# Patient Record
Sex: MALE | Race: WHITE | NOT HISPANIC OR LATINO | Employment: OTHER | URBAN - METROPOLITAN AREA
[De-identification: names, ages, dates, MRNs, and addresses within clinical notes are randomized per-mention and may not be internally consistent; named-entity substitution may affect disease eponyms.]

---

## 2022-11-04 ENCOUNTER — DOCUMENTATION ONLY (OUTPATIENT)
Dept: SURGERY | Facility: CLINIC | Age: 57
End: 2022-11-04
Payer: OTHER GOVERNMENT

## 2022-11-04 ENCOUNTER — OFFICE VISIT (OUTPATIENT)
Dept: PRIMARY CARE CLINIC | Facility: CLINIC | Age: 57
End: 2022-11-04
Payer: OTHER GOVERNMENT

## 2022-11-04 VITALS
HEIGHT: 69 IN | RESPIRATION RATE: 18 BRPM | BODY MASS INDEX: 28.09 KG/M2 | SYSTOLIC BLOOD PRESSURE: 138 MMHG | TEMPERATURE: 98 F | WEIGHT: 189.69 LBS | HEART RATE: 86 BPM | DIASTOLIC BLOOD PRESSURE: 60 MMHG | OXYGEN SATURATION: 100 %

## 2022-11-04 DIAGNOSIS — I10 ESSENTIAL (PRIMARY) HYPERTENSION: ICD-10-CM

## 2022-11-04 DIAGNOSIS — Z12.5 PROSTATE CANCER SCREENING: ICD-10-CM

## 2022-11-04 DIAGNOSIS — Z11.4 SCREENING FOR HIV (HUMAN IMMUNODEFICIENCY VIRUS): ICD-10-CM

## 2022-11-04 DIAGNOSIS — Z12.11 COLON CANCER SCREENING: ICD-10-CM

## 2022-11-04 DIAGNOSIS — Z13.1 SCREENING FOR DIABETES MELLITUS: ICD-10-CM

## 2022-11-04 DIAGNOSIS — G47.33 OSA ON CPAP: ICD-10-CM

## 2022-11-04 DIAGNOSIS — Z13.6 ENCOUNTER FOR SCREENING FOR CARDIOVASCULAR DISORDERS: ICD-10-CM

## 2022-11-04 DIAGNOSIS — F41.0 GENERALIZED ANXIETY DISORDER WITH PANIC ATTACKS: Primary | ICD-10-CM

## 2022-11-04 DIAGNOSIS — Z23 NEED FOR VACCINATION: ICD-10-CM

## 2022-11-04 DIAGNOSIS — Z11.59 NEED FOR HEPATITIS C SCREENING TEST: ICD-10-CM

## 2022-11-04 DIAGNOSIS — F41.1 GENERALIZED ANXIETY DISORDER WITH PANIC ATTACKS: Primary | ICD-10-CM

## 2022-11-04 PROCEDURE — 99204 PR OFFICE/OUTPT VISIT, NEW, LEVL IV, 45-59 MIN: ICD-10-PCS | Mod: S$PBB,,, | Performed by: FAMILY MEDICINE

## 2022-11-04 PROCEDURE — 99204 OFFICE O/P NEW MOD 45 MIN: CPT | Mod: S$PBB,,, | Performed by: FAMILY MEDICINE

## 2022-11-04 PROCEDURE — 99999 PR PBB SHADOW E&M-NEW PATIENT-LVL IV: CPT | Mod: PBBFAC,,, | Performed by: FAMILY MEDICINE

## 2022-11-04 PROCEDURE — 90471 IMMUNIZATION ADMIN: CPT | Mod: PBBFAC,PN

## 2022-11-04 PROCEDURE — 99999 PR PBB SHADOW E&M-NEW PATIENT-LVL IV: ICD-10-PCS | Mod: PBBFAC,,, | Performed by: FAMILY MEDICINE

## 2022-11-04 PROCEDURE — 99204 OFFICE O/P NEW MOD 45 MIN: CPT | Mod: PBBFAC,25,PN | Performed by: FAMILY MEDICINE

## 2022-11-04 RX ORDER — CHLORTHALIDONE 25 MG/1
25 TABLET ORAL DAILY
COMMUNITY
End: 2022-11-04 | Stop reason: SDUPTHER

## 2022-11-04 RX ORDER — AMLODIPINE BESYLATE 10 MG/1
10 TABLET ORAL DAILY
Qty: 90 TABLET | Refills: 3 | Status: SHIPPED | OUTPATIENT
Start: 2022-11-04 | End: 2024-02-01

## 2022-11-04 RX ORDER — ZOSTER VACCINE RECOMBINANT, ADJUVANTED 50 MCG/0.5
0.5 KIT INTRAMUSCULAR ONCE
Qty: 1 EACH | Refills: 1 | Status: SHIPPED | OUTPATIENT
Start: 2022-11-04 | End: 2022-11-04

## 2022-11-04 RX ORDER — CHLORTHALIDONE 25 MG/1
25 TABLET ORAL DAILY
Qty: 90 TABLET | Refills: 3 | Status: SHIPPED | OUTPATIENT
Start: 2022-11-04 | End: 2023-04-11

## 2022-11-04 RX ORDER — LISINOPRIL 40 MG/1
40 TABLET ORAL DAILY
COMMUNITY
End: 2022-11-04 | Stop reason: SDUPTHER

## 2022-11-04 RX ORDER — CETIRIZINE HYDROCHLORIDE 10 MG/1
10 TABLET ORAL DAILY
Qty: 90 TABLET | Refills: 3 | Status: SHIPPED | OUTPATIENT
Start: 2022-11-04 | End: 2023-11-28

## 2022-11-04 RX ORDER — SERTRALINE HYDROCHLORIDE 50 MG/1
TABLET, FILM COATED ORAL
Qty: 83 TABLET | Refills: 0 | Status: SHIPPED | OUTPATIENT
Start: 2022-11-04 | End: 2022-12-21

## 2022-11-04 RX ORDER — LISINOPRIL 40 MG/1
40 TABLET ORAL DAILY
Qty: 90 TABLET | Refills: 3 | Status: SHIPPED | OUTPATIENT
Start: 2022-11-04 | End: 2023-10-30

## 2022-11-04 RX ORDER — ATORVASTATIN CALCIUM 10 MG/1
10 TABLET, FILM COATED ORAL DAILY
COMMUNITY
End: 2022-11-04

## 2022-11-04 RX ORDER — AMLODIPINE BESYLATE 10 MG/1
10 TABLET ORAL DAILY
COMMUNITY
End: 2022-11-04 | Stop reason: SDUPTHER

## 2022-11-04 NOTE — PROGRESS NOTES
Verified pt by name and . NKDA. Per physician orders pt was administered Influenza Vaccine IM to left deltoid using aseptic technique. Pt tolerated well. No adverse effects or pain reported. MD notified.

## 2022-11-04 NOTE — PROGRESS NOTES
"Subjective:       Patient ID: Jose Carlos Worthington is a 57 y.o. male.    Chief Complaint: Establish Care    Recently retired from the , seen today to establish care.  [facility]. says he was started on atorvastatin "because my blood pressure was so high. " has never had high cholesterol.  No history of stroke or MI.    Anxiety  Presents for initial visit. Onset was 6 to 12 months ago. The problem has been gradually worsening. Symptoms include excessive worry, nervous/anxious behavior, panic and restlessness. Patient reports no chest pain, compulsions, confusion, depressed mood, dizziness, feeling of choking, irritability, malaise, nausea, palpitations, shortness of breath or suicidal ideas. The severity of symptoms is severe. The quality of sleep is fair. Nighttime awakenings: occasional.     Risk factors include a major life event and prior traumatic experience. There is no history of anxiety/panic attacks, bipolar disorder, chronic lung disease, depression, fibromyalgia or hyperthyroidism. Past treatments include nothing.   Review of Systems   Constitutional:  Negative for chills, fatigue, fever and irritability.   HENT:  Negative for congestion.    Eyes:  Negative for visual disturbance.   Respiratory:  Negative for cough and shortness of breath.    Cardiovascular:  Negative for chest pain and palpitations.   Gastrointestinal:  Negative for abdominal pain, nausea and vomiting.   Genitourinary:  Negative for difficulty urinating.   Musculoskeletal:  Negative for arthralgias.   Skin:  Negative for rash.   Neurological:  Negative for dizziness.   Psychiatric/Behavioral:  Negative for confusion, sleep disturbance and suicidal ideas. The patient is nervous/anxious.      Objective:      Vitals:    11/04/22 1216   BP: 138/60   BP Location: Right arm   Patient Position: Sitting   BP Method: Medium (Manual)   Pulse: 86   Resp: 18   Temp: 98 °F (36.7 °C)   TempSrc: Tympanic   SpO2: " "100%   Weight: 86 kg (189 lb 11.3 oz)   Height: 5' 9" (1.753 m)       Physical Exam  Vitals and nursing note reviewed.   Constitutional:       General: He is not in acute distress.     Appearance: Normal appearance. He is well-developed.   HENT:      Head: Normocephalic and atraumatic.   Neck:      Vascular: No carotid bruit.   Cardiovascular:      Rate and Rhythm: Normal rate and regular rhythm.      Heart sounds: Normal heart sounds.   Pulmonary:      Effort: Pulmonary effort is normal.      Breath sounds: Normal breath sounds.   Abdominal:      General: Bowel sounds are normal. There is no distension.      Tenderness: There is no abdominal tenderness.   Musculoskeletal:      Right lower leg: No edema.      Left lower leg: No edema.   Skin:     General: Skin is warm and dry.   Neurological:      Mental Status: He is alert and oriented to person, place, and time.   Psychiatric:         Mood and Affect: Mood is anxious.         Behavior: Behavior normal.         Thought Content: Thought content normal.         Cognition and Memory: Cognition and memory normal.       No results found for: WBC, HGB, HCT, PLT, CHOL, TRIG, HDL, LDLDIRECT, ALT, AST, NA, K, CL, CREATININE, BUN, CO2, TSH, PSA, INR, GLUF, HGBA1C, MICROALBUR   Assessment:       1. Generalized anxiety disorder with panic attacks    2. Need for vaccination    3. Essential (primary) hypertension    4. Prostate cancer screening    5. Colon cancer screening    6. Encounter for screening for cardiovascular disorders    7. ZENOBIA on CPAP    8. Need for hepatitis C screening test    9. Screening for HIV (human immunodeficiency virus)    10. Screening for diabetes mellitus          Plan:       Generalized anxiety disorder with panic attacks  -     sertraline (ZOLOFT) 50 MG tablet; Take 0.5 tablets (25 mg total) by mouth once daily for 14 days, THEN 1 tablet (50 mg total) once daily.  Dispense: 83 tablet; Refill: 0  -     Ambulatory referral/consult to Primary Care " Behavioral Health (Non-Opioids); Future; Expected date: 11/11/2022  Scored 14 on GAD7. Would benefit from combination of medication and CBT  Need for vaccination  -     Influenza - Quadrivalent *Preferred* (6 months+) (PF)    Essential (primary) hypertension  -     CBC Auto Differential; Future; Expected date: 11/04/2022  -     Comprehensive Metabolic Panel; Future; Expected date: 11/04/2022  -     chlorthalidone (HYGROTEN) 25 MG Tab; Take 1 tablet (25 mg total) by mouth once daily.  Dispense: 90 tablet; Refill: 3  -     amLODIPine (NORVASC) 10 MG tablet; Take 1 tablet (10 mg total) by mouth once daily.  Dispense: 90 tablet; Refill: 3  -     lisinopriL (PRINIVIL,ZESTRIL) 40 MG tablet; Take 1 tablet (40 mg total) by mouth once daily.  Dispense: 90 tablet; Refill: 3    Prostate cancer screening  -     PSA, Screening; Future; Expected date: 11/04/2022    Colon cancer screening  -     Case Request Endoscopy: COLONOSCOPY    Encounter for screening for cardiovascular disorders  -     Lipid Panel; Future; Expected date: 11/04/2022    ZENOBIA on CPAP  -     TSH; Future; Expected date: 11/04/2022    Need for hepatitis C screening test  -     Hepatitis C Antibody; Future; Expected date: 11/04/2022    Screening for HIV (human immunodeficiency virus)  -     HIV 1/2 Ag/Ab (4th Gen); Future; Expected date: 11/04/2022    Screening for diabetes mellitus  -     Hemoglobin A1C; Future; Expected date: 11/04/2022    Other orders  -     cetirizine (ZYRTEC) 10 MG tablet; Take 1 tablet (10 mg total) by mouth once daily.  Dispense: 90 tablet; Refill: 3    Medication List with Changes/Refills   New Medications    CETIRIZINE (ZYRTEC) 10 MG TABLET    Take 1 tablet (10 mg total) by mouth once daily.    SERTRALINE (ZOLOFT) 50 MG TABLET    Take 0.5 tablets (25 mg total) by mouth once daily for 14 days, THEN 1 tablet (50 mg total) once daily.   Changed and/or Refilled Medications    Modified Medication Previous Medication    AMLODIPINE (NORVASC) 10  MG TABLET amLODIPine (NORVASC) 10 MG tablet       Take 1 tablet (10 mg total) by mouth once daily.    Take 10 mg by mouth once daily.    CHLORTHALIDONE (HYGROTEN) 25 MG TAB chlorthalidone (HYGROTEN) 25 MG Tab       Take 1 tablet (25 mg total) by mouth once daily.    Take 25 mg by mouth once daily.    LISINOPRIL (PRINIVIL,ZESTRIL) 40 MG TABLET lisinopriL (PRINIVIL,ZESTRIL) 40 MG tablet       Take 1 tablet (40 mg total) by mouth once daily.    Take 40 mg by mouth once daily.   Discontinued Medications    ATORVASTATIN (LIPITOR) 10 MG TABLET    Take 10 mg by mouth once daily.

## 2022-11-04 NOTE — PROGRESS NOTES
This patient arrived at the clinic as a walk in patient, post visit with the physician,  requesting to be scheduled to have a Colonoscopy. A review of the patient's chart,denotes a referral to schedule the patient to have a Colonoscopy for colon cancer screening. The patient verbally consented to be scheduled to have the Colonoscopy, and was scheduled for the Colonoscopy on the date of the patient's request 01/12/2022. The patient was given a detailed explanation of the Colonoscopy prep instructions, along with a copy of the associated bowel prep instructions. The patient acknowledged understanding of the prep instructions, and was give an opportunity to ask any questions about the Colonoscopy procedure, and the associated prep instructions.     Bowel Prep/SUPREP instructions                                               [place]    [address] [place] [address]      You are scheduled for a Colonoscopy with _______________________ on ____________________   At [place] in [place].    Check in at the hospital on 1st floor Registration area next to Emergency room.    Please call [phone] to reschedule or if you have any questions.    An adult friend/family member must come with you to drive you home.  You cannot drive, take a taxi, Uber/Lyft or bus to leave the Hospital alone. If you do not have someone with you to drive you home, your test will be cancelled.       Please follow the directions of your doctor if you take any pills that thin your blood.  If you take these meds: Aggrenox, Brilinta, Effient, Eliquis, Lovenox, Plavix, Pletal Pradaxa ticilid, Xarelto, or Coumadin, let the doctor's office know.    Don't: On the morning of the test do not take insulin or pills for diabetes.   Do: On the morning of the test, do take any pills for blood pressure, heart, anti-rejection and or seizures with a small sip of water. Bring any inhalers with you day of  procedure.    To have a good prep, you must follow these instructions- please do not use the directions from the pharmacy!      The doctor will send a prescription for the SUPREP   The day Before the test:   You can only drink CLEAR LIQUIDS the whole day before your test. You can't eat any food for the whole day.    You CAN have:   Water,Coffee or decaf coffee (no milk or cream)    Tea   Soft drinks- regular and sugar free   Jell-O (green or yellow)   Apple Juice, grape juice, white cranberry juice   Gatorade, Power Aid, Crystal Light, El Aid   Lemonade and Limeade   Bouillon, clear soup   Snowball, popsicles   YOU CAN'T DRINK ANYTHING RED   YOU CAN'T DRINK ALCOHOL   ONLY DRINK WHAT IS ON THIS List      At 5pm the night before your test:   Pour the 1st bottle of SUPREP into the cup provided in the box.  Add water to the line on the cup and mix well. Drink the whole cup and then drink 2 more full cups of water over the 1 hour.    This can be easier to drink if it is cold.  You can mix it 20 minutes ahead of time and place in the refrigerator before you drink it.  You must drink it within 30-45 minutes of mixing it. Do NOT pour the drink over ice. You can drink it with a straw.    The Day of the test- We will call you 1 day before your test to tell you what time to get there.    5 hours before you come to the hospital (this may be the middle of the night)   Pour the 2nd bottle of SUPREP into to the cup provided in the box. Add water to the line on the cup and mix well. Drink the whole cup and then drink 2 more full cups of water over 1 hour.    It might be easier to drink if it is cold. You can mix it 20 minutes ahead of time and place in the refrigerator before you drink it. You must drink it within 30-45 minutes of mixing it. Do NOT pour the drink over ice. You can drink it with a straw.   YOU CAN'T EAT OR DRINK ANYTHING ELSE ONCE YOU FINISH THE PREP.   Leave all valuables and jewelry at home. You will be at the  [facility] for 2-4 hours.    Call the Endoscopy Scheduling Department at [phone] with any questions about your procedure.    Please  your medication from your local pharmacy. If you are unable to  the SUPREP Kit please contact our office.   Thank you   Endo Scheduling Dept   [facility]

## 2022-11-07 ENCOUNTER — PATIENT MESSAGE (OUTPATIENT)
Dept: BEHAVIORAL HEALTH | Facility: CLINIC | Age: 57
End: 2022-11-07
Payer: OTHER GOVERNMENT

## 2022-11-07 ENCOUNTER — TELEPHONE (OUTPATIENT)
Dept: BEHAVIORAL HEALTH | Facility: CLINIC | Age: 57
End: 2022-11-07
Payer: OTHER GOVERNMENT

## 2022-11-07 RX ORDER — SODIUM, POTASSIUM,MAG SULFATES 17.5-3.13G
1 SOLUTION, RECONSTITUTED, ORAL ORAL DAILY
Qty: 1 KIT | Refills: 0 | Status: SHIPPED | OUTPATIENT
Start: 2022-11-07 | End: 2022-11-09

## 2022-11-07 NOTE — PROGRESS NOTES
CHW reached out to pt to complete intake. Pt will be out of town in November, 2022 but would like a December visit. Scheduled pt with Dr. Dominique Griffiths, PhD, Psychology on 12/08/22 at 9:45am.  Pt was sent the PHQ8 and Promis 10 Intake assessments via portal to complete within the week. Completed the GAD7 on 11/4/22 with YRIS Rodriguez.

## 2022-11-10 ENCOUNTER — TELEPHONE (OUTPATIENT)
Dept: BEHAVIORAL HEALTH | Facility: CLINIC | Age: 57
End: 2022-11-10
Payer: OTHER GOVERNMENT

## 2022-11-10 NOTE — PROGRESS NOTES
Behavioral Health Community Health Worker  Initial Assessment  Completed by:  Samra Ramírez     Date:  11/10/2022    Patient Enrollment in Behavioral Health Program:  Patient verbalized understanding of Behavioral Health Integration services to include:  Patient understands that CHW, LCSW, PharmD and consulting Psychiatrist are members of the care team working collaboratively with his/her primary care provider: Yes  Patient understands that activation of their [facility] patient portal account is required for accessing the full scope of team services: Yes  Patient understands that some counseling sessions may occur via video: Yes  Clinic visits with the psychiatrist may be subject to a co-pay based on your insurance: Yes  Patient consents to enroll in BHI program: Yes    Assessments     Single Item Health Literacy Scale:  How often do you need to have someone help you read instructions, pamphlets or other written material from your doctor or pharmacy?: Never    Promis 10:  Promis 10 Responses  In general, would you say your health is: Good  In general, would you say your quality of life is: Good  In general, how would you rate your physical health?: Good  In general, how would you rate your mental health, including your mood and your ability to think?: Fair  In general, how would you rate your satisfaction with your social activities and relationships?: Fair  In general, please rate how well you carry out your usual social activities and roles. (This includes activities at home, at work and in your community, and responsibilities as a parent, child, spouse, employee, friend, etc.): Fair  To what extent are you able to carry out your everyday physical activities such as walking, climbing stairs, carrying groceries, or moving a chair? : Moderately  How often have you been bothered by emotional problems such as feeling anxious, depressed or irritable?: Often  In the past 7 days, how would you rate your fatigue on  "average?: Mild  In the past 7 days, on a scale of 0 to 10 (where 0 is no pain and 10 is the worst pain imaginable) how would you rate your pain on average?: 3  Global Physical Health: 11  Global Mental health Score: 11    Depression PHQ8 on 11/09/2022 at 2:10pm = 5  No flowsheet data found.      Generalized Anxiety Disorder 7-Item Scale:  GAD7 11/9/2022   1. Feeling nervous, anxious, or on edge? 1   2. Not being able to stop or control worrying? 1   3. Worrying too much about different things? 1   4. Trouble relaxing? 1   5. Being so restless that it is hard to sit still? 1   6. Becoming easily annoyed or irritable? 1   7. Feeling afraid as if something awful might happen? 1   8. If you checked off any problems, how difficult have these problems made it for you to do your work, take care of things at home, or get along with other people? -   AURELIO-7 Score 7       History     Social History     Socioeconomic History    Marital status:    Tobacco Use    Smoking status: Former     Types: Cigarettes    Smokeless tobacco: Never   Substance and Sexual Activity    Alcohol use: Yes     Alcohol/week: 6.0 standard drinks     Types: 6 Cans of beer per week    Sexual activity: Yes     Partners: Female   Social History Narrative    ** Merged History Encounter **            Call Summary     Patient was referred to the BHI (Non-opioid) program by Primary Care Provider, YRIS Rodriguez. W contacted Jose Carlos Worthington who reports depression and anxiety. Patient scored "5" on the PHQ9 and "7" on the AURELIO 7. Based on these scores patient is eligible for the Behavioral Health Integration (Non-opioid) Program. CHW completed the intake and scheduled an office appointment for patient with Dr. Dominique Griffiths, PhD, Psychology on 12/08/2022 at 9:45am.          "

## 2022-11-21 ENCOUNTER — TELEPHONE (OUTPATIENT)
Dept: GASTROENTEROLOGY | Facility: CLINIC | Age: 57
End: 2022-11-21
Payer: OTHER GOVERNMENT

## 2022-12-07 ENCOUNTER — TELEPHONE (OUTPATIENT)
Dept: BEHAVIORAL HEALTH | Facility: CLINIC | Age: 57
End: 2022-12-07
Payer: OTHER GOVERNMENT

## 2022-12-07 NOTE — PROGRESS NOTES
CHW reached out to pt to remind him of office appointment with Dr. Dominique Griffiths, PhD, Psychology tomorrow at 9:45am. Pt confirmed  the appointment.

## 2022-12-08 ENCOUNTER — OFFICE VISIT (OUTPATIENT)
Dept: BEHAVIORAL HEALTH | Facility: CLINIC | Age: 57
End: 2022-12-08
Payer: OTHER GOVERNMENT

## 2022-12-08 DIAGNOSIS — F41.1 GENERALIZED ANXIETY DISORDER WITH PANIC ATTACKS: ICD-10-CM

## 2022-12-08 DIAGNOSIS — F43.10 PTSD (POST-TRAUMATIC STRESS DISORDER): Primary | ICD-10-CM

## 2022-12-08 DIAGNOSIS — F41.0 GENERALIZED ANXIETY DISORDER WITH PANIC ATTACKS: ICD-10-CM

## 2022-12-08 PROCEDURE — 99999 PR PBB SHADOW E&M-EST. PATIENT-LVL I: CPT | Mod: PBBFAC,,, | Performed by: PSYCHOLOGIST

## 2022-12-08 PROCEDURE — 99999 PR PBB SHADOW E&M-EST. PATIENT-LVL I: ICD-10-PCS | Mod: PBBFAC,,, | Performed by: PSYCHOLOGIST

## 2022-12-08 PROCEDURE — 90791 PSYCH DIAGNOSTIC EVALUATION: CPT | Mod: ,,, | Performed by: PSYCHOLOGIST

## 2022-12-08 PROCEDURE — 90791 PR PSYCHIATRIC DIAGNOSTIC EVALUATION: ICD-10-PCS | Mod: ,,, | Performed by: PSYCHOLOGIST

## 2022-12-08 PROCEDURE — 99211 OFF/OP EST MAY X REQ PHY/QHP: CPT | Mod: PBBFAC,PN | Performed by: PSYCHOLOGIST
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
## 2022-12-13 ENCOUNTER — PATIENT MESSAGE (OUTPATIENT)
Dept: BEHAVIORAL HEALTH | Facility: CLINIC | Age: 57
End: 2022-12-13
Payer: OTHER GOVERNMENT

## 2022-12-15 ENCOUNTER — TELEPHONE (OUTPATIENT)
Dept: PSYCHIATRY | Facility: CLINIC | Age: 57
End: 2022-12-15
Payer: OTHER GOVERNMENT

## 2022-12-21 ENCOUNTER — OFFICE VISIT (OUTPATIENT)
Dept: PRIMARY CARE CLINIC | Facility: CLINIC | Age: 57
End: 2022-12-21
Payer: OTHER GOVERNMENT

## 2022-12-21 DIAGNOSIS — F41.0 GENERALIZED ANXIETY DISORDER WITH PANIC ATTACKS: Primary | ICD-10-CM

## 2022-12-21 DIAGNOSIS — F43.10 PTSD (POST-TRAUMATIC STRESS DISORDER): ICD-10-CM

## 2022-12-21 DIAGNOSIS — F41.1 GENERALIZED ANXIETY DISORDER WITH PANIC ATTACKS: Primary | ICD-10-CM

## 2022-12-21 DIAGNOSIS — F51.04 PSYCHOPHYSIOLOGICAL INSOMNIA: ICD-10-CM

## 2022-12-21 PROCEDURE — 99214 PR OFFICE/OUTPT VISIT, EST, LEVL IV, 30-39 MIN: ICD-10-PCS | Mod: 95,,, | Performed by: FAMILY MEDICINE

## 2022-12-21 PROCEDURE — 99214 OFFICE O/P EST MOD 30 MIN: CPT | Mod: 95,,, | Performed by: FAMILY MEDICINE

## 2022-12-21 RX ORDER — DULOXETIN HYDROCHLORIDE 30 MG/1
30 CAPSULE, DELAYED RELEASE ORAL DAILY
Qty: 90 CAPSULE | Refills: 0 | Status: SHIPPED | OUTPATIENT
Start: 2022-12-21 | End: 2023-03-23

## 2022-12-21 RX ORDER — TRAZODONE HYDROCHLORIDE 50 MG/1
TABLET ORAL
Qty: 60 TABLET | Refills: 2 | Status: SHIPPED | OUTPATIENT
Start: 2022-12-21
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
## 2023-01-17 ENCOUNTER — TELEPHONE (OUTPATIENT)
Dept: SURGERY | Facility: CLINIC | Age: 58
End: 2023-01-17
Payer: OTHER GOVERNMENT

## 2023-01-17 NOTE — TELEPHONE ENCOUNTER
Called the patient in reference to rescheduling the Colonoscopy. Patient chose to reschedule the Colonoscopy from 01//23/2023 to 03/02/2023. Patient was informed the Colonoscopy Prep instructions received for the canceled Colonoscopy, remains applicable to the rescheduled Colonoscopy. Colonoscopy Prep instructions were reiterated,discussed and explained meticulously in minute,thorough detail. Patient acquiesced understanding of instructions. The patient was offered the opportunity ask any questions about the Colonoscopy procedure and the related Colonoscopy Prep instructions.  Nothing further was discussed.

## 2023-01-30 ENCOUNTER — TELEPHONE (OUTPATIENT)
Dept: PSYCHIATRY | Facility: CLINIC | Age: 58
End: 2023-01-30
Payer: OTHER GOVERNMENT

## 2023-01-30 NOTE — TELEPHONE ENCOUNTER
Called to get patient rescheduled for [facility] Intake. It appears patient may have cancelled appointment that was scheduled. Patient was not reached.

## 2023-02-10 ENCOUNTER — TELEPHONE (OUTPATIENT)
Dept: PRIMARY CARE CLINIC | Facility: CLINIC | Age: 58
End: 2023-02-10
Payer: OTHER GOVERNMENT

## 2023-02-10 NOTE — TELEPHONE ENCOUNTER
I called and spoke with pt who adv he has been having ear pain since 4 am today and now he's developing nasal congestion. Pt req if ear drops or any other rx can be sent in. Pt declines any fever.

## 2023-02-10 NOTE — TELEPHONE ENCOUNTER
----- Message from Venus Campbellnhadeola sent at 2/10/2023 12:02 PM CST -----  Contact: [phone] patient  1MEDICALADVICE     Patient is calling for Medical Advice regarding: earache, stuffy on right side     How long has patient had these symptoms: 1 day    Pharmacy name and phone#:   [id number] - [place] - [address] [place] [facility] [place] [address]  [address] [address]  Phone: [phone] Fax: [phone]        Would like response via [employer]:  Call Back. Pt would like to have something called in please today. Pt does not want to go the weekend feeling this way. Please call and advise.     Comments:

## 2023-02-14 ENCOUNTER — OFFICE VISIT (OUTPATIENT)
Dept: PSYCHIATRY | Facility: CLINIC | Age: 58
End: 2023-02-14
Payer: OTHER GOVERNMENT

## 2023-02-14 DIAGNOSIS — F43.10 PTSD (POST-TRAUMATIC STRESS DISORDER): Primary | ICD-10-CM

## 2023-02-14 PROCEDURE — 99499 NO LOS: ICD-10-PCS | Mod: S$PBB,,, | Performed by: PSYCHOLOGIST

## 2023-02-14 PROCEDURE — 99499 UNLISTED E&M SERVICE: CPT | Mod: S$PBB,,, | Performed by: PSYCHOLOGIST
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
## 2023-02-16 ENCOUNTER — PATIENT MESSAGE (OUTPATIENT)
Dept: PSYCHIATRY | Facility: CLINIC | Age: 58
End: 2023-02-16
Payer: OTHER GOVERNMENT

## 2023-02-16 DIAGNOSIS — E87.8 HYPOCHLOREMIA: ICD-10-CM

## 2023-02-16 DIAGNOSIS — I10 ESSENTIAL (PRIMARY) HYPERTENSION: ICD-10-CM

## 2023-02-16 DIAGNOSIS — E87.1 HYPONATREMIA: Primary | ICD-10-CM

## 2023-02-16 RX ORDER — METOPROLOL SUCCINATE 50 MG/1
50 TABLET, EXTENDED RELEASE ORAL DAILY
Qty: 30 TABLET | Refills: 11 | Status: SHIPPED | OUTPATIENT
Start: 2023-02-16 | End: 2023-04-11

## 2023-03-08 ENCOUNTER — PATIENT MESSAGE (OUTPATIENT)
Dept: PSYCHIATRY | Facility: CLINIC | Age: 58
End: 2023-03-08
Payer: OTHER GOVERNMENT

## 2023-03-09 ENCOUNTER — TELEPHONE (OUTPATIENT)
Dept: PRIMARY CARE CLINIC | Facility: CLINIC | Age: 58
End: 2023-03-09
Payer: OTHER GOVERNMENT

## 2023-03-09 NOTE — TELEPHONE ENCOUNTER
----- Message from Aimee Styles sent at 3/9/2023 12:39 PM CST -----  Contact: Pt [phone]  Medical advice    The metoprolol succinate (TOPROL-XL) 50 MG 24 hr tablet was giving him side effects therefore, he stopped taking it. He want to know if you want him to stop taking it and get on something else. Symtoms: achy muscles, and ankle swelling    Thank you

## 2023-03-09 NOTE — TELEPHONE ENCOUNTER
I called and spoke to the pt who adv 4-5 days after starting Metoprolol he started having dizziness, swelling in ankles and aches in neck and shoulders. Pt d/c meds on 2/26/23 and symptoms have gone away. Pt asking for recommendations of whether a different medication should be prescribed

## 2023-03-09 NOTE — TELEPHONE ENCOUNTER
Have patient start monitoring his blood pressure, and get labs and urine test ordered by Dr. Sheldon last month.  Schedule a follow-up appointment with me, either in [place] or at [facility], in the next few weeks.
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
## 2023-03-29 ENCOUNTER — OFFICE VISIT (OUTPATIENT)
Dept: PSYCHIATRY | Facility: CLINIC | Age: 58
End: 2023-03-29
Payer: OTHER GOVERNMENT

## 2023-03-29 DIAGNOSIS — F41.0 GENERALIZED ANXIETY DISORDER WITH PANIC ATTACKS: ICD-10-CM

## 2023-03-29 DIAGNOSIS — F43.10 PTSD (POST-TRAUMATIC STRESS DISORDER): Primary | ICD-10-CM

## 2023-03-29 DIAGNOSIS — F41.1 GENERALIZED ANXIETY DISORDER WITH PANIC ATTACKS: ICD-10-CM

## 2023-03-29 PROCEDURE — 90837 PSYTX W PT 60 MINUTES: CPT | Mod: ,,, | Performed by: PSYCHOLOGIST

## 2023-03-29 PROCEDURE — 90837 PR PSYCHOTHERAPY W/PATIENT, 60 MIN: ICD-10-PCS | Mod: ,,, | Performed by: PSYCHOLOGIST

## 2023-04-05 ENCOUNTER — PATIENT MESSAGE (OUTPATIENT)
Dept: PSYCHIATRY | Facility: CLINIC | Age: 58
End: 2023-04-05
Payer: OTHER GOVERNMENT

## 2023-04-11 ENCOUNTER — OFFICE VISIT (OUTPATIENT)
Dept: PRIMARY CARE CLINIC | Facility: CLINIC | Age: 58
End: 2023-04-11
Payer: OTHER GOVERNMENT

## 2023-04-11 VITALS
BODY MASS INDEX: 29.06 KG/M2 | DIASTOLIC BLOOD PRESSURE: 76 MMHG | SYSTOLIC BLOOD PRESSURE: 122 MMHG | HEIGHT: 69 IN | HEART RATE: 92 BPM | WEIGHT: 196.19 LBS | OXYGEN SATURATION: 98 % | TEMPERATURE: 98 F | RESPIRATION RATE: 18 BRPM

## 2023-04-11 DIAGNOSIS — E87.1 HYPONATREMIA: ICD-10-CM

## 2023-04-11 DIAGNOSIS — I10 ESSENTIAL (PRIMARY) HYPERTENSION: Primary | ICD-10-CM

## 2023-04-11 PROCEDURE — 99999 PR PBB SHADOW E&M-EST. PATIENT-LVL IV: CPT | Mod: PBBFAC,,, | Performed by: FAMILY MEDICINE

## 2023-04-11 PROCEDURE — 99214 OFFICE O/P EST MOD 30 MIN: CPT | Mod: PBBFAC,PN | Performed by: FAMILY MEDICINE

## 2023-04-11 PROCEDURE — 99214 OFFICE O/P EST MOD 30 MIN: CPT | Mod: S$PBB,,, | Performed by: FAMILY MEDICINE

## 2023-04-11 PROCEDURE — 99214 PR OFFICE/OUTPT VISIT, EST, LEVL IV, 30-39 MIN: ICD-10-PCS | Mod: S$PBB,,, | Performed by: FAMILY MEDICINE

## 2023-04-11 PROCEDURE — 99999 PR PBB SHADOW E&M-EST. PATIENT-LVL IV: ICD-10-PCS | Mod: PBBFAC,,, | Performed by: FAMILY MEDICINE

## 2023-04-11 RX ORDER — ATORVASTATIN CALCIUM 10 MG/1
TABLET, FILM COATED ORAL
COMMUNITY
Start: 2020-01-15
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
## 2023-04-12 ENCOUNTER — OFFICE VISIT (OUTPATIENT)
Dept: PSYCHIATRY | Facility: CLINIC | Age: 58
End: 2023-04-12
Payer: OTHER GOVERNMENT

## 2023-04-12 DIAGNOSIS — F41.0 GENERALIZED ANXIETY DISORDER WITH PANIC ATTACKS: ICD-10-CM

## 2023-04-12 DIAGNOSIS — F43.10 PTSD (POST-TRAUMATIC STRESS DISORDER): Primary | ICD-10-CM

## 2023-04-12 DIAGNOSIS — F41.1 GENERALIZED ANXIETY DISORDER WITH PANIC ATTACKS: ICD-10-CM

## 2023-04-12 PROCEDURE — 90837 PSYTX W PT 60 MINUTES: CPT | Mod: ,,, | Performed by: PSYCHOLOGIST

## 2023-04-12 PROCEDURE — 90837 PR PSYCHOTHERAPY W/PATIENT, 60 MIN: ICD-10-PCS | Mod: ,,, | Performed by: PSYCHOLOGIST

## 2023-04-25 ENCOUNTER — PATIENT MESSAGE (OUTPATIENT)
Dept: PSYCHIATRY | Facility: CLINIC | Age: 58
End: 2023-04-25
Payer: OTHER GOVERNMENT

## 2023-07-18 ENCOUNTER — TELEPHONE (OUTPATIENT)
Dept: PRIMARY CARE CLINIC | Facility: CLINIC | Age: 58
End: 2023-07-18
Payer: OTHER GOVERNMENT

## 2023-07-18 DIAGNOSIS — I10 ESSENTIAL (PRIMARY) HYPERTENSION: Primary | ICD-10-CM

## 2023-07-18 NOTE — TELEPHONE ENCOUNTER
----- Message from Aimee Styles sent at 7/18/2023  8:31 AM CDT -----  Contact: Pt [phone]  He said the oral surgeon advised him not to continue taking the amLODIPine (NORVASC) 10 MG tablet because it might be causing gum loss.    [facility] #[id number] - [place], [place] - [address] [place] [facility] [address] Phone:  [phone] Fax:  [phone]

## 2023-07-18 NOTE — TELEPHONE ENCOUNTER
Called pt regarding message. He said the oral surgeon advised him not to continue taking the amLODIPine (NORVASC) 10 MG tablet because it might be causing gum loss. Pt asked to be prescribed an alternative.

## 2023-07-19 RX ORDER — HYDROCHLOROTHIAZIDE 12.5 MG/1
12.5 TABLET ORAL DAILY
Qty: 30 TABLET | Refills: 11 | Status: SHIPPED | OUTPATIENT
Start: 2023-07-19 | End: 2024-07-18

## 2023-07-19 NOTE — TELEPHONE ENCOUNTER
Called pt regarding message. He said the oral surgeon advised him not to continue taking the amLODIPine (NORVASC) 10 MG tablet because it might be causing gum loss. Pt asked to be prescribed an alternative

## 2023-07-19 NOTE — TELEPHONE ENCOUNTER
I had not heard this about amlodipine. Okay to stop and I have written for a fluid pill to help with pressure. Follow up in 2 weeks for blood pressure check and BMP

## 2023-07-19 NOTE — TELEPHONE ENCOUNTER
----- Message from Rebeca Mc sent at 7/19/2023  9:35 AM CDT -----  Contact: self [phone]  Patient is returning a phone call.  Who left a message for the patient: Domonique Rodriguez MA  Does patient know what this is regarding:    Would you like a call back, or a response through your [facility] portal?:   call back  Comments:     Please call and advise

## 2023-07-31 ENCOUNTER — TELEPHONE (OUTPATIENT)
Dept: PRIMARY CARE CLINIC | Facility: CLINIC | Age: 58
End: 2023-07-31
Payer: OTHER GOVERNMENT

## 2023-07-31 DIAGNOSIS — L71.9 ROSACEA: Primary | ICD-10-CM

## 2023-08-03 ENCOUNTER — PATIENT MESSAGE (OUTPATIENT)
Dept: PRIMARY CARE CLINIC | Facility: CLINIC | Age: 58
End: 2023-08-03
Payer: OTHER GOVERNMENT

## 2023-08-03 ENCOUNTER — TELEPHONE (OUTPATIENT)
Dept: PRIMARY CARE CLINIC | Facility: CLINIC | Age: 58
End: 2023-08-03
Payer: OTHER GOVERNMENT

## 2023-08-03 ENCOUNTER — CLINICAL SUPPORT (OUTPATIENT)
Dept: PRIMARY CARE CLINIC | Facility: CLINIC | Age: 58
End: 2023-08-03
Payer: OTHER GOVERNMENT

## 2023-08-03 VITALS
BODY MASS INDEX: 29.03 KG/M2 | DIASTOLIC BLOOD PRESSURE: 82 MMHG | SYSTOLIC BLOOD PRESSURE: 150 MMHG | HEIGHT: 69 IN | HEART RATE: 81 BPM | OXYGEN SATURATION: 98 % | WEIGHT: 196 LBS | TEMPERATURE: 97 F | RESPIRATION RATE: 18 BRPM

## 2023-08-03 DIAGNOSIS — Z01.30 BLOOD PRESSURE CHECK: Primary | ICD-10-CM

## 2023-08-03 PROCEDURE — 99999 PR PBB SHADOW E&M-EST. PATIENT-LVL III: ICD-10-PCS | Mod: PBBFAC,,,

## 2023-08-03 PROCEDURE — 99213 OFFICE O/P EST LOW 20 MIN: CPT | Mod: PBBFAC,PN

## 2023-08-03 PROCEDURE — 99999 PR PBB SHADOW E&M-EST. PATIENT-LVL III: CPT | Mod: PBBFAC,,,

## 2023-08-03 NOTE — PROGRESS NOTES
Pt arrived in clinic for blood pressure check. No chief complaints or pain reported. Pt's vitals as follows: /82, P 81, R 18, T 97.1, and O2 saturation 98%. Pt stated BP has been normal. Pt stated he hasn't slept good last night. Pt stated when he doesn't sleep good his BP is high. Pt stated he hasn't been having any headaches. Pt stated his BP medication has been working fine and he doesn't have any side effects. Pt has been monitoring BP at home. Pt will send updated BP reading this week.

## 2023-08-04 VITALS — DIASTOLIC BLOOD PRESSURE: 78 MMHG | SYSTOLIC BLOOD PRESSURE: 137 MMHG

## 2023-08-15 ENCOUNTER — OFFICE VISIT (OUTPATIENT)
Dept: OPTOMETRY | Facility: CLINIC | Age: 58
End: 2023-08-15
Payer: OTHER GOVERNMENT

## 2023-08-15 DIAGNOSIS — H52.03 HYPEROPIA WITH ASTIGMATISM AND PRESBYOPIA, BILATERAL: ICD-10-CM

## 2023-08-15 DIAGNOSIS — L71.9 ROSACEA: ICD-10-CM

## 2023-08-15 DIAGNOSIS — H43.393 VITREOUS FLOATERS OF BOTH EYES: Primary | ICD-10-CM

## 2023-08-15 DIAGNOSIS — H52.4 HYPEROPIA WITH ASTIGMATISM AND PRESBYOPIA, BILATERAL: ICD-10-CM

## 2023-08-15 DIAGNOSIS — H52.203 HYPEROPIA WITH ASTIGMATISM AND PRESBYOPIA, BILATERAL: ICD-10-CM

## 2023-08-15 PROCEDURE — 99999 PR PBB SHADOW E&M-EST. PATIENT-LVL III: CPT | Mod: PBBFAC,,,

## 2023-08-15 PROCEDURE — 92015 PR REFRACTION: ICD-10-PCS | Mod: ,,,

## 2023-08-15 PROCEDURE — 92004 COMPRE OPH EXAM NEW PT 1/>: CPT | Mod: S$PBB,,,

## 2023-08-15 PROCEDURE — 99213 OFFICE O/P EST LOW 20 MIN: CPT | Mod: PBBFAC,PO

## 2023-08-15 PROCEDURE — 92015 DETERMINE REFRACTIVE STATE: CPT | Mod: ,,,

## 2023-08-15 PROCEDURE — 99999 PR PBB SHADOW E&M-EST. PATIENT-LVL III: ICD-10-PCS | Mod: PBBFAC,,,

## 2023-08-15 PROCEDURE — 92004 PR EYE EXAM, NEW PATIENT,COMPREHESV: ICD-10-PCS | Mod: S$PBB,,,

## 2023-08-15 NOTE — PROGRESS NOTES
HPI    New pt here for annual exam. Last exam- 1 1/2 years    Pt sts he wears his bifocals for reading in low lighting. Largely goes   uncorrected in the distance. Pt denies flashes/floaters/pain. Pt denies   use of gtt.   Last edited by Cathy Aleman, OD on 8/15/2023 11:44 AM.            Assessment /Plan     For exam results, see Encounter Report.    Vitreous floaters of both eyes    Rosacea    Hyperopia with astigmatism and presbyopia, bilateral      Ed pt on benign nature of vitreous floaters. Reviewed signs and symptoms of a retinal detachment thoroughly and ed pt to RTC asap if experienced.t  Pt has rosacea, followed by dermatology and on medication. Pt has no ocular complaints. Discussed nature of ocular rosacea with pt and recommended OTC PF artificial tears to use BID-QID daily. Discussed potential need for long term treatment with doxycycline. Monitor yearly for now, sooner if symptoms worsen.  Discussed spectacle options with pt and released final spec rx. Ed pt on change in rx and adaptation.    RTC: 1 year for comprehensive exam or sooner prn

## 2023-09-18 ENCOUNTER — PATIENT MESSAGE (OUTPATIENT)
Dept: PRIMARY CARE CLINIC | Facility: CLINIC | Age: 58
End: 2023-09-18
Payer: OTHER GOVERNMENT

## 2023-09-27 RX ORDER — AMLODIPINE BESYLATE 10 MG/1
10 TABLET ORAL DAILY
Qty: 30 TABLET | Refills: 3 | Status: SHIPPED | OUTPATIENT
Start: 2023-09-27 | End: 2024-01-25

## 2023-09-27 NOTE — TELEPHONE ENCOUNTER
No care due was identified.  [facility] Embedded Care Due Messages. Reference number: [id number].   9/27/2023 11:24:32 AM CDT

## 2023-09-27 NOTE — TELEPHONE ENCOUNTER
----- Message from Michelle Porter sent at 9/27/2023 11:01 AM CDT -----  Contact: [phone]  1MEDICALADVICE     Patient is calling for Medical Advice regarding:taken off amlodipine     How long has patient had these symptoms:1    Pharmacy name and phone#:  [id number] - [place] [address] [place] [facility] [address]  [address] [address]  Phone: [phone] Fax: [phone]       Would like response via [employer]:  no     Comments:pt states he was taken off the amlodipine per the dentist and now they are telling him to get back on it and he states he is needing this to be filled again for him please advise

## 2023-09-27 NOTE — TELEPHONE ENCOUNTER
Called pt regarding message. Pt stated he was switched from Amlodipine to Hydrochlorothiazide 1 month ago per his dentist. Pt is requesting to switch back to Amlodipine.

## 2023-10-18 ENCOUNTER — PATIENT MESSAGE (OUTPATIENT)
Dept: CARDIOLOGY | Facility: CLINIC | Age: 58
End: 2023-10-18
Payer: OTHER GOVERNMENT

## 2023-10-30 DIAGNOSIS — I10 ESSENTIAL (PRIMARY) HYPERTENSION: ICD-10-CM

## 2023-10-30 RX ORDER — LISINOPRIL 40 MG/1
40 TABLET ORAL
Qty: 90 TABLET | Refills: 1 | Status: SHIPPED | OUTPATIENT
Start: 2023-10-30

## 2023-10-30 NOTE — TELEPHONE ENCOUNTER
No care due was identified.  [facility] Embedded Care Due Messages. Reference number: [id number].   10/30/2023 3:46:01 AM CDT

## 2023-10-30 NOTE — TELEPHONE ENCOUNTER
Refill Decision Note   Jose Carlos Worthington  is requesting a refill authorization.  Brief Assessment and Rationale for Refill:  Approve     Medication Therapy Plan:         Comments:     Note composed:6:49 AM 10/30/2023

## 2024-01-18 ENCOUNTER — TELEPHONE (OUTPATIENT)
Dept: PRIMARY CARE CLINIC | Facility: CLINIC | Age: 59
End: 2024-01-18
Payer: OTHER GOVERNMENT

## 2024-01-18 NOTE — TELEPHONE ENCOUNTER
----- Message from Demetrice Gonzalez sent at 1/18/2024 10:53 AM CST -----  Contact: Patient, [phone]  Patient is returning a phone call.  Who left a message for the patient: Sally  Does patient know what this is regarding:   referral for Dr Chintan Rios  Would you like a call back, or a response through your [facility] portal?:   Call back  Comments: Missed your call, please call him back. Thanks.

## 2024-01-18 NOTE — TELEPHONE ENCOUNTER
----- Message from Marli Woods sent at 1/18/2024  8:59 AM CST -----  Contact: [phone]@[email or web address]  Good morning patient would like a call back to discuss getting a referral for dermatology out of [facility]. Please give patient a call back [phone]

## 2024-01-18 NOTE — TELEPHONE ENCOUNTER
Called pt regarding message. Pt requested copy of Dermatology referral to be faxed. Informed pt referral has been printed & faxed.

## 2024-02-01 DIAGNOSIS — I10 ESSENTIAL (PRIMARY) HYPERTENSION: ICD-10-CM

## 2024-02-01 RX ORDER — AMLODIPINE BESYLATE 10 MG/1
10 TABLET ORAL
Qty: 90 TABLET | Refills: 0 | Status: SHIPPED | OUTPATIENT
Start: 2024-02-01 | End: 2024-05-05

## 2024-02-01 NOTE — TELEPHONE ENCOUNTER
Provider Staff:  Action required for this patient    Requires labs      Please see care gap opportunities below in Care Due Message.    Thanks!  [facility]     Appointments      Date Provider   Last Visit   4/11/2023 Danny Grant MD   Next Visit   Visit date not found Danny Grant MD     Refill Decision Note   Jose Carlos Worthington  is requesting a refill authorization.  Brief Assessment and Rationale for Refill:  Approve     Medication Therapy Plan:         Comments:     Note composed:10:35 AM 02/01/2024

## 2024-02-01 NOTE — TELEPHONE ENCOUNTER
Care Due:                  Date            Visit Type   Department     Provider  --------------------------------------------------------------------------------                                EP -                              PRIMARY      [facility]  Last Visit: 04-      CARE ([facility])   PRIMARY CARE   Danny Grant  Next Visit: None Scheduled  None         None Found                                                            Last  Test          Frequency    Reason                     Performed    Due Date  --------------------------------------------------------------------------------    CMP.........  12 months..  lisinopriL...............  02-   03-    [facility] Embedded Care Due Messages. Reference number: [id number].   2/01/2024 3:45:18 AM CST

## 2024-04-27 DIAGNOSIS — I10 ESSENTIAL (PRIMARY) HYPERTENSION: ICD-10-CM

## 2024-04-27 NOTE — TELEPHONE ENCOUNTER
Care Due:                  Date            Visit Type   Department     Provider  --------------------------------------------------------------------------------                                EP -                              PRIMARY      [facility]  Last Visit: 04-      CARE ([facility])   PRIMARY CARE   Danny Grant                               -                              PRIMARY      [facility]ESSIC[facility]  Next Visit: 05-      CARE ([facility])   PRIMARY CARE   Danny Grant                                                            Last  Test          Frequency    Reason                     Performed    Due Date  --------------------------------------------------------------------------------    CMP.........  12 months..  lisinopriL...............  02-   03-    Health [facility] Embedded Care Due Messages. Reference number: [id number].   4/27/2024 6:55:16 AM CDT

## 2024-04-28 NOTE — TELEPHONE ENCOUNTER
Refill Routing Note   Medication(s) are not appropriate for processing by [facility] for the following reason(s):        Required labs outdated    [facility] action(s):  Defer     Requires labs : Yes             Appointments  past 12m or future 3m with PCP    Date Provider   Last Visit   4/11/2023 Danny Grant MD   Next Visit   5/22/2024 Danny Grant MD   ED visits in past 90 days: 0        Note composed:12:40 PM 04/28/2024

## 2024-04-29 RX ORDER — LISINOPRIL 40 MG/1
40 TABLET ORAL
Qty: 90 TABLET | Refills: 0 | Status: SHIPPED | OUTPATIENT
Start: 2024-04-29

## 2024-05-05 DIAGNOSIS — I10 ESSENTIAL (PRIMARY) HYPERTENSION: ICD-10-CM

## 2024-05-05 RX ORDER — AMLODIPINE BESYLATE 10 MG/1
10 TABLET ORAL
Qty: 90 TABLET | Refills: 0 | Status: SHIPPED | OUTPATIENT
Start: 2024-05-05

## 2024-05-05 NOTE — TELEPHONE ENCOUNTER
Refill Decision Note   Jose Carlos Worthington  is requesting a refill authorization.  Brief Assessment and Rationale for Refill:  Approve     Medication Therapy Plan:         Comments:     Note composed:4:42 AM 05/05/2024

## 2024-05-05 NOTE — TELEPHONE ENCOUNTER
No care due was identified.  [facility] Embedded Care Due Messages. Reference number: [id number].   5/05/2024 3:43:25 AM CDT

## 2024-07-27 DIAGNOSIS — I10 ESSENTIAL (PRIMARY) HYPERTENSION: ICD-10-CM

## 2024-07-27 NOTE — TELEPHONE ENCOUNTER
Care Due:                  Date            Visit Type   Department     Provider  --------------------------------------------------------------------------------                                EP -                              PRIMARY      [facility]  Last Visit: 04-      CARE ([facility])   PRIMARY CARE   Danny Grant                              [facility]                              ANNUAL                              CHECKUP/PHY  [facility]  Next Visit: 08-      S            PRIMARY CARE   Danny Grant                                                            Last  Test          Frequency    Reason                     Performed    Due Date  --------------------------------------------------------------------------------    CMP.........  12 months..  lisinopriL...............  02-   03-    Health [facility] Embedded Care Due Messages. Reference number: [id number].   7/27/2024 3:44:27 AM CDT

## 2024-07-29 RX ORDER — LISINOPRIL 40 MG/1
40 TABLET ORAL
Qty: 90 TABLET | Refills: 0 | Status: SHIPPED | OUTPATIENT
Start: 2024-07-29

## 2024-07-29 NOTE — TELEPHONE ENCOUNTER
Refill Routing Note   Medication(s) are not appropriate for processing by [facility] for the following reason(s):        Patient not seen by provider within 15 months  Required labs outdated    [facility] action(s):  Defer     Requires labs : Yes      Medication Therapy Plan: FOV 8/30/24      Appointments  past 12m or future 3m with PCP    Date Provider   Last Visit   4/11/2023 Danny Grant MD   Next Visit   8/30/2024 Danny Grant MD   ED visits in past 90 days: 0        Note composed:8:37 AM 07/29/2024

## 2024-08-01 DIAGNOSIS — I10 ESSENTIAL (PRIMARY) HYPERTENSION: ICD-10-CM

## 2024-08-01 RX ORDER — AMLODIPINE BESYLATE 10 MG/1
10 TABLET ORAL
Qty: 90 TABLET | Refills: 0 | Status: SHIPPED | OUTPATIENT
Start: 2024-08-01

## 2024-08-01 NOTE — TELEPHONE ENCOUNTER
Refill Routing Note   Medication(s) are not appropriate for processing by [facility] for the following reason(s):        Patient not seen by provider within 15 months    [facility] action(s):  Defer               Appointments  past 12m or future 3m with PCP    Date Provider   Last Visit   4/11/2023 Danny Grant MD   Next Visit   8/30/2024 Danny Grant MD   ED visits in past 90 days: 0        Note composed:10:15 AM 08/01/2024

## 2024-08-01 NOTE — TELEPHONE ENCOUNTER
No care due was identified.  [facility] Embedded Care Due Messages. Reference number: [id number].   8/01/2024 3:44:28 AM CDT

## 2024-09-19 ENCOUNTER — PATIENT MESSAGE (OUTPATIENT)
Dept: PRIMARY CARE CLINIC | Facility: CLINIC | Age: 59
End: 2024-09-19
Payer: OTHER GOVERNMENT

## 2024-10-10 ENCOUNTER — PATIENT MESSAGE (OUTPATIENT)
Dept: BEHAVIORAL HEALTH | Facility: CLINIC | Age: 59
End: 2024-10-10
Payer: OTHER GOVERNMENT

## 2024-10-10 ENCOUNTER — OFFICE VISIT (OUTPATIENT)
Dept: PRIMARY CARE CLINIC | Facility: CLINIC | Age: 59
End: 2024-10-10
Payer: OTHER GOVERNMENT

## 2024-10-10 VITALS
SYSTOLIC BLOOD PRESSURE: 152 MMHG | OXYGEN SATURATION: 100 % | DIASTOLIC BLOOD PRESSURE: 78 MMHG | RESPIRATION RATE: 20 BRPM | WEIGHT: 197.06 LBS | TEMPERATURE: 98 F | HEIGHT: 69 IN | HEART RATE: 95 BPM | BODY MASS INDEX: 29.19 KG/M2

## 2024-10-10 DIAGNOSIS — Z23 NEED FOR VACCINATION: ICD-10-CM

## 2024-10-10 DIAGNOSIS — I10 ESSENTIAL (PRIMARY) HYPERTENSION: ICD-10-CM

## 2024-10-10 DIAGNOSIS — Z12.5 PROSTATE CANCER SCREENING: ICD-10-CM

## 2024-10-10 DIAGNOSIS — L71.9 ROSACEA: ICD-10-CM

## 2024-10-10 DIAGNOSIS — Z00.00 ANNUAL PHYSICAL EXAM: Primary | ICD-10-CM

## 2024-10-10 DIAGNOSIS — F43.10 PTSD (POST-TRAUMATIC STRESS DISORDER): ICD-10-CM

## 2024-10-10 PROCEDURE — 93010 ELECTROCARDIOGRAM REPORT: CPT | Mod: S$PBB,,, | Performed by: INTERNAL MEDICINE

## 2024-10-10 PROCEDURE — 93005 ELECTROCARDIOGRAM TRACING: CPT | Mod: PBBFAC,PN | Performed by: INTERNAL MEDICINE

## 2024-10-10 PROCEDURE — 90471 IMMUNIZATION ADMIN: CPT | Mod: PBBFAC,PN

## 2024-10-10 PROCEDURE — 99999 PR PBB SHADOW E&M-EST. PATIENT-LVL V: CPT | Mod: PBBFAC,,, | Performed by: FAMILY MEDICINE

## 2024-10-10 PROCEDURE — 99396 PREV VISIT EST AGE 40-64: CPT | Mod: S$PBB,,, | Performed by: FAMILY MEDICINE

## 2024-10-10 PROCEDURE — 90656 IIV3 VACC NO PRSV 0.5 ML IM: CPT | Mod: PBBFAC,PN

## 2024-10-10 PROCEDURE — 99215 OFFICE O/P EST HI 40 MIN: CPT | Mod: PBBFAC,PN | Performed by: FAMILY MEDICINE

## 2024-10-10 PROCEDURE — 99999PBSHW PR PBB SHADOW TECHNICAL ONLY FILED TO HB: Mod: PBBFAC,,,

## 2024-10-10 RX ORDER — IRBESARTAN 300 MG/1
300 TABLET ORAL DAILY
Qty: 90 TABLET | Refills: 0 | Status: SHIPPED | OUTPATIENT
Start: 2024-10-10

## 2024-10-10 RX ORDER — SERTRALINE HYDROCHLORIDE 25 MG/1
25 TABLET, FILM COATED ORAL DAILY
Qty: 90 TABLET | Refills: 1 | Status: SHIPPED | OUTPATIENT
Start: 2024-10-10

## 2024-10-10 RX ORDER — CETIRIZINE HYDROCHLORIDE 10 MG/1
10 TABLET ORAL DAILY
Qty: 90 TABLET | Refills: 3 | Status: SHIPPED | OUTPATIENT
Start: 2024-10-10

## 2024-10-10 RX ADMIN — INFLUENZA VIRUS VACCINE 0.5 ML: 15; 15; 15 SUSPENSION INTRAMUSCULAR at 10:10

## 2024-10-10 NOTE — PROGRESS NOTES
Verified pt ID using name and . Lauren Reyes, LPN. Administered Influenza in right deltoid per physician order using aseptic technique. Aspirated and no blood return noted. Pt tolerated well with no adverse reactions noted.

## 2024-10-10 NOTE — PROGRESS NOTES
Assessment:       1. Annual physical exam    2. Prostate cancer screening    3. Essential (primary) hypertension    4. PTSD (post-traumatic stress disorder)    5. Need for vaccination    6. Rosacea         Plan:       Annual physical exam  -     EKG 12-lead  -     CBC Auto Differential; Future; Expected date: 10/10/2024  -     Comprehensive Metabolic Panel; Future; Expected date: 10/10/2024  -     Lipid Panel; Future; Expected date: 10/10/2024  -     Hemoglobin A1C; Future; Expected date: 10/10/2024  -     TSH; Future; Expected date: 10/10/2024  -     PSA, Screening; Future; Expected date: 10/10/2024    Prostate cancer screening  -     PSA, Screening; Future; Expected date: 10/10/2024    Essential (primary) hypertension  -     EKG 12-lead  -     irbesartan (AVAPRO) 300 MG tablet; Take 1 tablet (300 mg total) by mouth once daily.  Dispense: 90 tablet; Refill: 0    PTSD (post-traumatic stress disorder)  -     Ambulatory referral/consult to Primary Care Behavioral Health (Non-Opioids); Future; Expected date: 10/17/2024  -     sertraline (ZOLOFT) 25 MG tablet; Take 1 tablet (25 mg total) by mouth once daily.  Dispense: 90 tablet; Refill: 1    Need for vaccination  -     influenza (Flulaval, Fluzone, Fluarix) 45 mcg/0.5 mL IM vaccine (> or = 6 mo) 0.5 mL    Rosacea  -     Ambulatory referral/consult to Dermatology; Future; Expected date: 10/17/2024    Other orders  -     cetirizine (ZYRTEC) 10 MG tablet; Take 1 tablet (10 mg total) by mouth once daily.  Dispense: 90 tablet; Refill: 3      Assessment & Plan    - Assessed patient's blood pressure, which was elevated during the visit  - Evaluated anxiety and PTSD symptoms, which have been ongoing and impacting quality of life  - Reviewed EKG results, which were normal  - Changed antihypertensive regimen due to potential dental side effects from amlodipine  - Selected sertraline as first-line treatment for anxiety and PTSD symptoms    ANXIETY AND PTSD:   Explained that  sertraline is not habit-forming and should help decrease baseline anxiety level and reduce frequency and intensity of breakthrough anxiety.   Started sertraline 25 mg daily for anxiety and PTSD.   Referred patient to [facility] for PTSD program.   Follow up in 4 weeks via telemedicine to assess blood pressure control and anxiety/PTSD symptoms.   Await contact from office to schedule PTSD counseling appointment.    HYPERTENSION:   Discussed that blood pressure medication changes may be necessary to find an effective regimen without the dental side effects.   Patient to purchase a new blood pressure cuff and start monitoring blood pressure at home.   Started irbesartan 300 mg daily for hypertension.   Discontinued amlodipine and lisinopril.    ECZEMA AND ROSACEA:   Referred patient to dermatologist for eczema and rosacea management.    MEDICATIONS/SUPPLEMENTS:   Discontinued atorvastatin (clarified it was erroneously on medication list).   Refilled desloratadine.    FLU VACCINATION:   Administered flu vaccine in office.    LABS:   Ordered fasting labs to be completed in 2-3 weeks.   Performed EKG in office.    FOLLOW UP:   Stop at checkout window to schedule dermatology appointment and fasting labs.       Medication List with Changes/Refills   New Medications    IRBESARTAN (AVAPRO) 300 MG TABLET    Take 1 tablet (300 mg total) by mouth once daily.    SERTRALINE (ZOLOFT) 25 MG TABLET    Take 1 tablet (25 mg total) by mouth once daily.   Current Medications    TRAZODONE (DESYREL) 50 MG TABLET    1-2 tablets at bedtime as needed for insomnia   Changed and/or Refilled Medications    Modified Medication Previous Medication    CETIRIZINE (ZYRTEC) 10 MG TABLET cetirizine (ZYRTEC) 10 MG tablet       Take 1 tablet (10 mg total) by mouth once daily.    Take 1 tablet (10 mg total) by mouth once daily.   Discontinued Medications    AMLODIPINE (NORVASC) 10 MG TABLET    TAKE 1 TABLET(10 MG) BY MOUTH EVERY DAY    ATORVASTATIN (LIPITOR) 10  "MG TABLET        DULOXETINE (CYMBALTA) 30 MG CAPSULE    TAKE 1 CAPSULE(30 MG) BY MOUTH EVERY DAY    HYDROCHLOROTHIAZIDE (HYDRODIURIL) 12.5 MG TAB    Take 1 tablet (12.5 mg total) by mouth once daily.    LISINOPRIL (PRINIVIL,ZESTRIL) 40 MG TABLET    TAKE 1 TABLET(40 MG) BY MOUTH EVERY DAY         Subjective:    Patient ID: Jose Carlos Worthington is a 59 y.o. male.  Chief Complaint: Annual Exam    HPI  History of Present Illness    CHIEF COMPLAINT:  Patient presents today for annual follow-up with concerns about high blood pressure.    HYPERTENSION:  He reports a history of elevated blood pressure. Home readings were consistently around 130 until his blood pressure machine broke 2-3 months ago. He expresses concern about high blood pressure during today's visit. He is currently taking Amlodipine 10 mg and Lisinopril 40 mg for management. Previously, hydrochlorothiazide was ineffective, increasing his blood pressure to 150/unknown. Chlorthalidone effectively lowered his blood pressure to the 120s but was discontinued due to concerns about electrolyte imbalances.    ANXIETY AND PTSD:  He reports ongoing issues with anxiety and a previous PTSD diagnosis. He describes symptoms as coming and going in a "weird" pattern, affecting his medical appointments. He expresses willingness to try daily medication and counseling. He previously used Cymbalta for PTSD but discontinued due to feeling "weirded out" and "out of it." He attended a PTSD program for about 1.5 months, finding it somewhat helpful but ultimately discontinued as discussing his issues seemed to exacerbate his symptoms.    SLEEP:  He currently takes trazodone as needed for sleep.    DENTAL HEALTH:  He reports periodontal disease, which both his dentist and periodontist attribute to amlodipine use. He has frequent dental visits every three months and consults a periodontist, causing financial strain. He emphasizes having no prior dental issues before starting " "amlodipine.    LIFESTYLE:  He engages in regular exercise, including working out and playing golf. He reports consuming alcohol without mentioning any restrictions.      ROS:  Psychiatric: +anxiety       Review of Systems    Objective:      Vitals:    10/10/24 1010 10/10/24 1045   BP: (!) 160/80 (!) 152/78   Pulse: 95    Resp: 20    Temp: 97.5 °F (36.4 °C)    TempSrc: Temporal    SpO2: 100%    Weight: 89.4 kg (197 lb 1.5 oz)    Height: 5' 9" (1.753 m)      BP Readings from Last 5 Encounters:   10/10/24 (!) 152/78   08/04/23 137/78   08/03/23 (!) 150/82   04/11/23 122/76   03/02/23 127/72     Wt Readings from Last 5 Encounters:   10/10/24 89.4 kg (197 lb 1.5 oz)   08/03/23 88.9 kg (196 lb)   04/11/23 89 kg (196 lb 3.4 oz)   03/02/23 86.6 kg (190 lb 14.7 oz)   11/04/22 86 kg (189 lb 11.3 oz)     Physical Exam  Physical Exam    Vitals: Blood pressure elevated.  General: Well-developed. Well-nourished. No acute distress.  Eyes: EOMI. Sclerae anicteric.  HENT: Normocephalic. Atraumatic. Nares patent. Moist oral mucosa.  Cardiovascular: Regular rate. Regular rhythm. No murmurs. No rubs. No gallops. Normal S1, S2.  Respiratory: Normal respiratory effort. Clear to auscultation bilaterally. No rales. No rhonchi. No wheezing.  Musculoskeletal: No  obvious deformity.  Extremities: Mild edema in one leg.  Neurological: Alert & oriented x3. No slurred speech. Normal gait.  Psychiatric: Normal mood. Normal affect. Good insight. Good judgment.  Skin: Warm. Dry. No rash.         Lab Results   Component Value Date    WBC 4.06 02/16/2023    HGB 14.0 02/16/2023    HCT 38.3 (L) 02/16/2023     02/16/2023    CHOL 165 02/16/2023    TRIG 56 02/16/2023    HDL 75 02/16/2023    ALT 24 02/16/2023    AST 26 02/16/2023     (L) 03/16/2023    K 4.5 03/16/2023    CL 96 03/16/2023    CREATININE 1.1 03/16/2023    BUN 13 03/16/2023    CO2 24 03/16/2023    TSH 2.719 02/16/2023    PSA 0.42 02/16/2023    HGBA1C 5.0 02/16/2023      This note " was generated with the assistance of ambient listening technology. Verbal consent was obtained by the patient and accompanying visitor(s) for the recording of patient appointment to facilitate this note. I attest to having reviewed and edited the generated note for accuracy, though some syntax or spelling errors may persist. Please contact the author of this note for any clarification.

## 2024-10-11 ENCOUNTER — TELEPHONE (OUTPATIENT)
Dept: BEHAVIORAL HEALTH | Facility: CLINIC | Age: 59
End: 2024-10-11
Payer: OTHER GOVERNMENT

## 2024-10-11 LAB
OHS QRS DURATION: 86 MS
OHS QTC CALCULATION: 427 MS

## 2024-10-11 NOTE — PROGRESS NOTES
CHW reached out to pt, pt is at work in a meeting now, cannot do intake. Sent intake assessments via portal, pt will call back to schedule with Negro Zaragoza LPC.

## 2024-10-15 ENCOUNTER — PATIENT MESSAGE (OUTPATIENT)
Dept: BEHAVIORAL HEALTH | Facility: CLINIC | Age: 59
End: 2024-10-15
Payer: OTHER GOVERNMENT

## 2024-10-21 ENCOUNTER — TELEPHONE (OUTPATIENT)
Dept: BEHAVIORAL HEALTH | Facility: CLINIC | Age: 59
End: 2024-10-21
Payer: OTHER GOVERNMENT

## 2024-10-21 NOTE — PROGRESS NOTES
Behavioral Health Community Health Worker  Initial Assessment  Completed by:  Samra Ramírez     Date:  10/21/2024    Patient Enrollment in Behavioral Health Program:  Patient verbalized understanding of Behavioral Health Integration services to include:  Patient understands that CHW, LCSW, PharmD and consulting Psychiatrist are members of the care team working collaboratively with his/her primary care provider: Yes  Patient understands that activation of their [facility] patient portal account is required for accessing the full scope of team services: Yes  Patient understands that some counseling sessions may occur via video: Yes  Clinic visits with the psychiatrist may be subject to a co-pay based on your insurance: Yes  Patient consents to enroll in BHI program: Yes    Assessments     Single Item Health Literacy Scale:  How often do you need to have someone help you read instructions, pamphlets or other written material from your doctor or pharmacy?: Never    Promis 10:  Promis 10 Responses  In general, would you say your health is: Good  In general, would you say your quality of life is: Good  In general, how would you rate your physical health?: Good  In general, how would you rate your mental health, including your mood and your ability to think?: Fair  In general, how would you rate your satisfaction with your social activities and relationships?: Fair  In general, please rate how well you carry out your usual social activities and roles. (This includes activities at home, at work and in your community, and responsibilities as a parent, child, spouse, employee, friend, etc.): Fair  To what extent are you able to carry out your everyday physical activities such as walking, climbing stairs, carrying groceries, or moving a chair? : Mostly  How often have you been bothered by emotional problems such as feeling anxious, depressed or irritable?: Often  In the past 7 days, how would you rate your fatigue on average?:  Mild  In the past 7 days, on a scale of 0 to 10 (where 0 is no pain and 10 is the worst pain imaginable) how would you rate your pain on average?: 3  Global Physical Health: 12  Global Mental health Score: 11    Depression PHQ:      10/21/2024     3:05 PM   PHQ9   Little interest or pleasure in doing things 1    Feeling down, depressed, or hopeless 1    Trouble falling or staying asleep, or sleeping too much 2    Feeling tired or having little energy 1    Poor appetite or overeating 0    Feeling bad about yourself - or that you are a failure or have let yourself or your family down 0    Trouble concentrating on things, such as reading the newspaper or watching television 1    Moving or speaking so slowly that other people could have noticed. Or the opposite - being so fidgety or restless that you have been moving around a lot more than usual 1    PHQ-9 Total Score 7        Patient-reported         Generalized Anxiety Disorder 7-Item Scale:      10/21/2024     3:29 PM   GAD7   1. Feeling nervous, anxious, or on edge? 1   2. Not being able to stop or control worrying? 1   3. Worrying too much about different things? 1   4. Trouble relaxing? 1   5. Being so restless that it is hard to sit still? 1   6. Becoming easily annoyed or irritable? 1   7. Feeling afraid as if something awful might happen? 1   8. If you checked off any problems, how difficult have these problems made it for you to do your work, take care of things at home, or get along with other people? 1   AURELIO-7 Score 7       History     Social History     Socioeconomic History    Marital status:    Tobacco Use    Smoking status: Former     Types: Cigarettes    Smokeless tobacco: Never   Substance and Sexual Activity    Alcohol use: Yes     Alcohol/week: 6.0 standard drinks of alcohol     Types: 6 Cans of beer per week     Comment: weekend    Drug use: Never    Sexual activity: Yes     Partners: Female   Social History Narrative    ** Merged History  "Encounter **          Social Drivers of Health     Financial Resource Strain: Low Risk  (10/10/2024)    Overall Financial Resource Strain (CARDIA)     Difficulty of Paying Living Expenses: Not very hard   Food Insecurity: No Food Insecurity (10/10/2024)    Hunger Vital Sign     Worried About Running Out of Food in the Last Year: Never true     Ran Out of Food in the Last Year: Never true   Physical Activity: Unknown (10/10/2024)    Exercise Vital Sign     Days of Exercise per Week: 3 days   Stress: No Stress Concern Present (10/10/2024)    [employer] [place] [employer] - Occupational Stress Questionnaire     Feeling of Stress : Only a little   Housing Stability: Unknown (10/10/2024)    Housing Stability Vital Sign     Unable to Pay for Housing in the Last Year: No       Call Summary     Patient was referred to the BHI (Non-opioid) program by Primary Care Provider, Dr. Danny Grant.  CHW contacted Jose Carlos Worthington who reports depression and anxiety that limits his activities of daily living (ADLs).   Patient scored "7" on the PHQ9 and "7" on the AURELIO 7. Based on these scores patient is eligible for the Behavioral Health Integration (Non-opioid) Program. CHW completed the intake and scheduled a new patient in-person appointment for patient with Negro Zaragoza LPC on Thursday, 11/7/2024 at 9:00am.          "

## 2024-10-29 DIAGNOSIS — I10 ESSENTIAL (PRIMARY) HYPERTENSION: ICD-10-CM

## 2024-10-29 RX ORDER — LISINOPRIL 40 MG/1
40 TABLET ORAL
Qty: 90 TABLET | Refills: 0 | OUTPATIENT
Start: 2024-10-29
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
## 2024-11-07 ENCOUNTER — TELEPHONE (OUTPATIENT)
Dept: BEHAVIORAL HEALTH | Facility: CLINIC | Age: 59
End: 2024-11-07
Payer: OTHER GOVERNMENT

## 2024-11-07 ENCOUNTER — OFFICE VISIT (OUTPATIENT)
Dept: BEHAVIORAL HEALTH | Facility: CLINIC | Age: 59
End: 2024-11-07
Payer: OTHER GOVERNMENT

## 2024-11-07 DIAGNOSIS — F41.0 GENERALIZED ANXIETY DISORDER WITH PANIC ATTACKS: ICD-10-CM

## 2024-11-07 DIAGNOSIS — F43.10 PTSD (POST-TRAUMATIC STRESS DISORDER): Primary | ICD-10-CM

## 2024-11-07 DIAGNOSIS — F41.1 GENERALIZED ANXIETY DISORDER WITH PANIC ATTACKS: ICD-10-CM

## 2024-11-07 PROCEDURE — 99999 PR PBB SHADOW E&M-EST. PATIENT-LVL I: CPT | Mod: PBBFAC,,, | Performed by: COUNSELOR

## 2024-11-07 PROCEDURE — 99211 OFF/OP EST MAY X REQ PHY/QHP: CPT | Mod: PBBFAC,PN | Performed by: COUNSELOR

## 2024-11-07 PROCEDURE — 90791 PSYCH DIAGNOSTIC EVALUATION: CPT | Mod: ,,, | Performed by: COUNSELOR

## 2024-11-07 NOTE — PROGRESS NOTES
CHW reached out to pt to reschedule in-person appointment with Negro Zaragoza LPC from 11/19/2024 o 11/26/2024 at 10:00am. No answer, LVM and sent portal message to pls call back if scheduled appointment on Tuesday, 11/26/2024 at 10:00am does not work for him.

## 2024-11-13 ENCOUNTER — OFFICE VISIT (OUTPATIENT)
Dept: PRIMARY CARE CLINIC | Facility: CLINIC | Age: 59
End: 2024-11-13
Payer: OTHER GOVERNMENT

## 2024-11-13 VITALS — SYSTOLIC BLOOD PRESSURE: 162 MMHG | DIASTOLIC BLOOD PRESSURE: 83 MMHG

## 2024-11-13 DIAGNOSIS — I10 ESSENTIAL (PRIMARY) HYPERTENSION: Primary | ICD-10-CM

## 2024-11-13 DIAGNOSIS — F51.04 PSYCHOPHYSIOLOGICAL INSOMNIA: ICD-10-CM

## 2024-11-13 PROCEDURE — 99214 OFFICE O/P EST MOD 30 MIN: CPT | Mod: 95,,, | Performed by: FAMILY MEDICINE

## 2024-11-13 RX ORDER — SPIRONOLACTONE 25 MG/1
25 TABLET ORAL DAILY
Qty: 90 TABLET | Refills: 0 | Status: SHIPPED | OUTPATIENT
Start: 2024-11-13

## 2024-11-13 RX ORDER — TRAZODONE HYDROCHLORIDE 50 MG/1
TABLET ORAL
Qty: 60 TABLET | Refills: 2 | Status: SHIPPED | OUTPATIENT
Start: 2024-11-13

## 2024-11-13 NOTE — PROGRESS NOTES
Assessment:       1. Essential (primary) hypertension    2. Psychophysiological insomnia        Plan:       Essential (primary) hypertension  -     spironolactone (ALDACTONE) 25 MG tablet; Take 1 tablet (25 mg total) by mouth once daily.  Dispense: 90 tablet; Refill: 0  -     Basic Metabolic Panel; Future; Expected date: 12/13/2024  -     [facility] E-Visit    Psychophysiological insomnia  -     traZODone (DESYREL) 50 MG tablet; 1-2 tablets at bedtime as needed for insomnia  Dispense: 60 tablet; Refill: 2      Assessment & Plan    - Reviewed patient's blood pressure readings, noting recent increase to 160s/80s range  - Considered patient's previous adverse reaction to hydrochlorothiazide (low sodium)  - Opted to maintain max dose of irbesartan and add spironolactone as adjunct therapy  - Chose spironolactone for its diuretic properties and lower risk of hyponatremia  - Planned to start at 25 mg daily with potential for dose titration  - Will monitor electrolytes given patient's history    HYPERTENSION:   Started spironolactone 25 mg daily.   Continued irbesartan 300 mg daily.   Follow up in 4 weeks for blood pressure follow-up via e-visit.   Contact the office if experiencing headaches, blurry vision, or other concerning symptoms.    INSOMNIA:   Refilled trazodone for as-needed use.    LABS:   Metabolic panel ordered for 1 month from now.   Complete metabolic panel prior to e-visit.        Medication List with Changes/Refills   New Medications    SPIRONOLACTONE (ALDACTONE) 25 MG TABLET    Take 1 tablet (25 mg total) by mouth once daily.   Current Medications    CETIRIZINE (ZYRTEC) 10 MG TABLET    Take 1 tablet (10 mg total) by mouth once daily.    IRBESARTAN (AVAPRO) 300 MG TABLET    Take 1 tablet (300 mg total) by mouth once daily.    SERTRALINE (ZOLOFT) 25 MG TABLET    Take 1 tablet (25 mg total) by mouth once daily.   Changed and/or Refilled Medications    Modified Medication Previous Medication    TRAZODONE (DESYREL)  50 MG TABLET traZODone (DESYREL) 50 MG tablet       1-2 tablets at bedtime as needed for insomnia    1-2 tablets at bedtime as needed for insomnia         Subjective:       Patient ID: Jose Carlos Worthington is a 59 y.o. male.    Chief Complaint: Follow-up (4 week BP f/up)      HPI  History of Present Illness    CHIEF COMPLAINT:  Patient presents for follow-up to discuss blood pressure management.    HPI:  Patient reports blood pressure fluctuations over the past few weeks. Initially, it decreased to the 140s systolic with diastolic in the 80s, but increased in the past week to levels similar to those recorded during the previous office visit. Today at 10:00 AM, the patient recorded a blood pressure of 162/83, with a similar reading of approximately 161 systolic noted from the previous week. Patient considered contacting the physician on Thursday due to these elevated readings but decided to wait for this scheduled appointment.    Patient reports feeling well overall, with no headaches or blurry vision. He notes significant improvement after discontinuing a previous medication (likely amlodipine), including rapid weight loss of about 3 lbs, reduced bloating, improved exercise tolerance, and a reduction in general aches and pains, particularly in back issues.    Patient has a history of taking hydrochlorothiazide, which effectively lowered blood pressure but caused significant sodium level reduction, leading to its discontinuation during a previous visit with the current physician.    MEDICATIONS:  Patient is on Irbesartan 300 mg daily for high blood pressure. He also has Trazodone for sleep, which he takes as needed, though he mentions having an  bottle.    MEDICAL HISTORY:  Patient has a history of hypertension.    TEST RESULTS:  Patient's past sodium level was low, which led to the discontinuation of hydrochlorothiazide. A subsequent metabolic panel showed normal results, with the sodium level improving after  stopping hydrochlorothiazide.    SOCIAL HISTORY:  Patient is a [employer]  and was previously under the care of [facility] doctors.      ROS:  Constitutional: +weight loss  Head: -headaches  Eyes: -vision changes       The patient location is: [place]  The chief complaint leading to consultation is: HTN    Visit type: audiovisual    Face to Face time with patient: 9 minutes  11 minutes of total time spent on the encounter, which includes face to face time and non-face to face time preparing to see the patient (eg, review of tests), Obtaining and/or reviewing separately obtained history, Documenting clinical information in the electronic or other health record, Independently interpreting results (not separately reported) and communicating results to the patient/family/caregiver, or Care coordination (not separately reported).         Each patient to whom he or she provides medical services by telemedicine is:  (1) informed of the relationship between the physician and patient and the respective role of any other health care provider with respect to management of the patient; and (2) notified that he or she may decline to receive medical services by telemedicine and may withdraw from such care at any time.    Notes:    Review of Systems    Objective:      Vitals:    11/13/24 1354   BP: (!) 162/83     BP Readings from Last 5 Encounters:   11/13/24 (!) 162/83   10/10/24 (!) 152/78   08/04/23 137/78   08/03/23 (!) 150/82   04/11/23 122/76     Wt Readings from Last 5 Encounters:   10/10/24 89.4 kg (197 lb 1.5 oz)   08/03/23 88.9 kg (196 lb)   04/11/23 89 kg (196 lb 3.4 oz)   03/02/23 86.6 kg (190 lb 14.7 oz)   11/04/22 86 kg (189 lb 11.3 oz)     Physical Exam    Lab Results   Component Value Date    WBC 5.35 11/07/2024    HGB 16.0 11/07/2024    HCT 45.8 11/07/2024     11/07/2024    CHOL 224 (H) 11/07/2024    TRIG 86 11/07/2024    HDL 70 11/07/2024    ALT 29 11/07/2024    AST 25 11/07/2024     11/07/2024    K 4.1  11/07/2024     11/07/2024    CREATININE 1.1 11/07/2024    BUN 9 11/07/2024    CO2 23 11/07/2024    TSH 2.237 11/07/2024    PSA 0.51 11/07/2024    HGBA1C 5.0 11/07/2024       This note was generated with the assistance of ambient listening technology. Verbal consent was obtained by the patient and accompanying visitor(s) for the recording of patient appointment to facilitate this note. I attest to having reviewed and edited the generated note for accuracy, though some syntax or spelling errors may persist. Please contact the author of this note for any clarification.

## 2024-11-25 ENCOUNTER — PATIENT MESSAGE (OUTPATIENT)
Dept: BEHAVIORAL HEALTH | Facility: CLINIC | Age: 59
End: 2024-11-25
Payer: OTHER GOVERNMENT

## 2024-11-29 ENCOUNTER — TELEPHONE (OUTPATIENT)
Dept: BEHAVIORAL HEALTH | Facility: CLINIC | Age: 59
End: 2024-11-29
Payer: OTHER GOVERNMENT

## 2024-11-29 NOTE — PROGRESS NOTES
CHW reached out to estab pt to reschedule an in-person appointment with Negro Zaragoza LPC. Rescheduled on Tuesday, 12/17/2024 at 8:00am.

## 2024-12-03 ENCOUNTER — TELEPHONE (OUTPATIENT)
Dept: PRIMARY CARE CLINIC | Facility: CLINIC | Age: 59
End: 2024-12-03
Payer: OTHER GOVERNMENT

## 2024-12-03 DIAGNOSIS — M79.672 PAIN OF LEFT HEEL: Primary | ICD-10-CM

## 2024-12-03 NOTE — TELEPHONE ENCOUNTER
----- Message from Diana sent at 12/3/2024  9:13 AM CST -----  Contact: Pt  [phone]  Patient would like to get a referral.  Referral to what specialty:  Orthopedics   Does the patient want the referral with a specific physician:    Is the specialist an [facility]:    Reason (be specific):  Heel pain   Does the patient already have the specialty clinic appointment scheduled:    If yes, what date is the appointment scheduled:     Is the insurance listed in Epic correct? (this is important for a referral):Yes    Advised patient that once provider approves this either a nurse or  will return their call?:   Would the patient like a call back, or a response through their [facility] portal?:   Call  Comments:

## 2024-12-03 NOTE — TELEPHONE ENCOUNTER
Called pt regarding message. Pt is requesting ortho referral for left heel pain. Symptoms started 1 month ago.

## 2024-12-06 ENCOUNTER — OFFICE VISIT (OUTPATIENT)
Dept: PODIATRY | Facility: CLINIC | Age: 59
End: 2024-12-06
Payer: OTHER GOVERNMENT

## 2024-12-06 VITALS — WEIGHT: 197.06 LBS | BODY MASS INDEX: 29.19 KG/M2 | HEIGHT: 69 IN

## 2024-12-06 DIAGNOSIS — M79.672 PAIN OF LEFT HEEL: ICD-10-CM

## 2024-12-06 DIAGNOSIS — M76.62 TENDONITIS, ACHILLES, LEFT: Primary | ICD-10-CM

## 2024-12-06 PROCEDURE — 99999 PR PBB SHADOW E&M-EST. PATIENT-LVL III: CPT | Mod: PBBFAC,,, | Performed by: PODIATRIST

## 2024-12-06 PROCEDURE — 99213 OFFICE O/P EST LOW 20 MIN: CPT | Mod: PBBFAC,PO | Performed by: PODIATRIST

## 2024-12-06 RX ORDER — DOXYCYCLINE HYCLATE 100 MG
TABLET ORAL
COMMUNITY
Start: 2024-01-18

## 2024-12-06 RX ORDER — AZELASTINE 1 MG/ML
SPRAY, METERED NASAL
COMMUNITY
Start: 2024-02-15

## 2024-12-06 RX ORDER — BENZONATATE 200 MG/1
CAPSULE ORAL
COMMUNITY
Start: 2024-02-15

## 2024-12-06 RX ORDER — MELOXICAM 15 MG/1
15 TABLET ORAL DAILY
Qty: 30 TABLET | Refills: 0 | Status: SHIPPED | OUTPATIENT
Start: 2024-12-06

## 2024-12-06 NOTE — PROGRESS NOTES
Subjective:      Patient ID: Jose Carlos Worthington is a 59 y.o. male.    Chief Complaint: Heel Pain    Jose Carlos is a 59 y.o. male who presents to the podiatry clinic  with complaint of  left foot pain posterior heel. Onset of the symptoms was  a few weeks ago . Precipitating event: Believes it may have started after walking up a steep incline getting [place]. Current symptoms include: ability to bear weight, but with some pain and worsening symptoms after a period of activity. Aggravating factors: any weight bearing. Symptoms have gradually worsened. Patient has had no prior foot problems. Evaluation to date: x-rays were normal. Treatment to date: none. Patients rates pain 7/10 on pain scale.    Review of Systems   Constitutional: Negative for chills and fever.   Cardiovascular:  Negative for claudication and leg swelling.   Respiratory:  Negative for shortness of breath.    Skin:  Negative for itching, nail changes and rash.   Musculoskeletal:  Negative for muscle cramps, muscle weakness and myalgias.        Left heel pain   Gastrointestinal:  Negative for nausea and vomiting.   Neurological:  Negative for focal weakness, loss of balance, numbness and paresthesias.           Objective:      Physical Exam  Constitutional:       General: He is not in acute distress.     Appearance: He is well-developed. He is not diaphoretic.   Cardiovascular:      Pulses:           Dorsalis pedis pulses are 2+ on the right side and 2+ on the left side.        Posterior tibial pulses are 2+ on the right side and 2+ on the left side.      Comments: < 3 sec capillary refill time to toes 1-5 bilateral. Toes and feet are warm to touch proximally with normal distal cooling b/l. There is some hair growth on the feet and toes b/l. There is no edema b/l. No spider veins or varicosities present b/l.     Musculoskeletal:      Comments: Equinus noted b/l ankles with < 10 deg DF noted. MMT 5/5 in DF/PF/Inv/Ev resistance with  "no reproduction of pain in any direction. Passive range of motion of ankle and pedal joints is painless b/l.    Tenderness to palpation at the distal achilles and the attachment on the calcaneus more to the lateral aspect of the heel, some tightness and discomfort with DF end ROM   Skin:     General: Skin is warm and dry.      Coloration: Skin is not pale.      Findings: No abrasion, bruising, burn, ecchymosis, erythema, laceration, lesion, petechiae or rash.      Nails: There is no clubbing.      Comments: Skin temperature, texture and turgor within normal limits.   Neurological:      Mental Status: He is alert and oriented to person, place, and time.      Sensory: No sensory deficit.      Motor: No tremor, atrophy or abnormal muscle tone.      Comments: Negative tinel sign bilateral.   Psychiatric:         Behavior: Behavior normal.               Assessment:       Encounter Diagnoses   Name Primary?    Pain of left heel     Tendonitis, Achilles, left Yes         Plan:       Jose Carlos Burrell" was seen today for heel pain.    Diagnoses and all orders for this visit:    Tendonitis, Achilles, left    Pain of left heel  -     Ambulatory referral/consult to Orthopedics    Other orders  -     meloxicam (MOBIC) 15 MG tablet; Take 1 tablet (15 mg total) by mouth once daily.      I counseled the patient on his conditions, their implications and medical management.    The patient was advised that NSAID-type medications have two very important potential side effects: gastrointestinal irritation including hemorrhage and renal injuries. He was asked to take the medication with food and to stop if he experiences any GI upset.     Patient will stretch the tendo achilles complex three times daily as demonstrated in the office.  Literature was dispensed illustrating proper stretching technique.    Consider PT or going into a boot if pain persists    Can get an MRI if conservative care is not helping    Return 4-6 weeks for follow " bertram Cruz, LUIS

## 2024-12-11 ENCOUNTER — E-VISIT (OUTPATIENT)
Dept: PRIMARY CARE CLINIC | Facility: CLINIC | Age: 59
End: 2024-12-11
Payer: OTHER GOVERNMENT

## 2024-12-11 DIAGNOSIS — I10 ESSENTIAL (PRIMARY) HYPERTENSION: Primary | ICD-10-CM
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
## 2024-12-17 ENCOUNTER — OFFICE VISIT (OUTPATIENT)
Dept: BEHAVIORAL HEALTH | Facility: CLINIC | Age: 59
End: 2024-12-17
Payer: OTHER GOVERNMENT

## 2024-12-17 VITALS — DIASTOLIC BLOOD PRESSURE: 89 MMHG | SYSTOLIC BLOOD PRESSURE: 154 MMHG

## 2024-12-17 DIAGNOSIS — F43.10 PTSD (POST-TRAUMATIC STRESS DISORDER): Primary | ICD-10-CM

## 2024-12-17 DIAGNOSIS — F41.0 GENERALIZED ANXIETY DISORDER WITH PANIC ATTACKS: ICD-10-CM

## 2024-12-17 DIAGNOSIS — F41.1 GENERALIZED ANXIETY DISORDER WITH PANIC ATTACKS: ICD-10-CM

## 2024-12-17 PROCEDURE — 90837 PSYTX W PT 60 MINUTES: CPT | Mod: ,,, | Performed by: COUNSELOR

## 2024-12-17 PROCEDURE — 99211 OFF/OP EST MAY X REQ PHY/QHP: CPT | Mod: PBBFAC,PN | Performed by: COUNSELOR

## 2024-12-17 PROCEDURE — 99999 PR PBB SHADOW E&M-EST. PATIENT-LVL I: CPT | Mod: PBBFAC,,, | Performed by: COUNSELOR

## 2024-12-17 RX ORDER — HYDRALAZINE HYDROCHLORIDE 25 MG/1
25 TABLET, FILM COATED ORAL 2 TIMES DAILY
Qty: 180 TABLET | Refills: 0 | Status: SHIPPED | OUTPATIENT
Start: 2024-12-17

## 2024-12-17 NOTE — PROGRESS NOTES
@Patient ID: Jose Carlos Worthington is a 59 y.o. male.    Chief Complaint: Hypertension (Entered automatically based on patient selection in [employer].)    The patient initiated a request through [employer] on 12/11/2024 for evaluation and management with a chief complaint of Hypertension (Entered automatically based on patient selection in [employer].)     I evaluated the questionnaire submission on 12/17/24.    Ohs Peq Evisit Hypertension    12/17/2024  6:36 AM CST - Filed by Patient   Do you agree to participate in an E-Visit? Yes   If you have any of the following symptoms, please do not complete an E-Visit. Instead, schedule an appointment with your provider I acknowledge   Choose the state of your primary residence [place]   What would you like addressed about your blood pressure? Recent blood pressure medication change   What is the main issue you would like addressed today? Blood pressure   How would you classify your blood pressure? Hard to control   Are you having any of the following symptoms from your high blood pressure? None of the above   Are you taking any of the following medications? None of these   The following factors can make high blood pressure worse or harder to control. Which of them might be contributing to your high blood pressure?  Lack of exercise   Have you taken blood pressure medications in the past that caused you problems or side effects? I don't know   Are you currently taking medication(s) for your blood pressure? Yes   Have you recently started a new medication or changed your dose? Yes   How often are you taking your medication per week?  Every day   Have you had any side effects from your current blood pressure medication? No   Are you able to take your blood pressure? Yes   Please give your most recent blood pressure readings    Reading 1 155/87 12/13/24 9:00am   Reading 2 154/89 12/16/24 10am   Reading 3    Reading 4    Reading 5    If you are able to take your pulse, please provide it  below.    Provide any additional information you feel is important.    Please attach any relevant images or files    Are you able to take any other vitals? No         Encounter Diagnosis   Name Primary?    Essential (primary) hypertension Yes        No orders of the defined types were placed in this encounter.     Medications Ordered This Encounter   Medications    hydrALAZINE (APRESOLINE) 25 MG tablet     Sig: Take 1 tablet (25 mg total) by mouth 2 (two) times a day.     Dispense:  180 tablet     Refill:  0     .        No follow-ups on file.      E-Visit Time Tracking:    Day 1 Time (in minutes): 11    Total Time (in minutes): 11

## 2024-12-17 NOTE — PROGRESS NOTES
Patient discussed during [facility] meeting today, 12/17/2024.  Patient with poorly controlled symptoms of PTSD, Anxiety, and Insomnia.  He is currently taking Zoloft 25mg daily but reports it makes him feel groggy.  Per chart, he also tried it in 2022 but it made him feel anxious.  I would recommend stopping Zoloft and instead starting low dose Prozac 10mg daily since this would be less likely to make him feel groggy.  I would then recommend increasing dose as he tolerates since his PTSD symptoms may take higher doses of an SSRI.  He is currently prescribed Trazodone 50-100mg qHS - Negro will clarify with him which dose he is taking.  He reports it has been minimally helpful.      Time: 10 minutes    The above treatment considerations and suggestions are based on consultations with the patients Behavioral Health Integration therapist and a review of information available in the patient's chart.  I have not personally examined the patient.  All recommendations should be implemented with consideration of the patients relevant prior history and current clinical status.  It remains the responsibility of the patient's Primary Care Physician to prescribe medications and to educate the patient on risks versus benefits, alternative treatments, side effect profile and the inherent unpredictability of individual responses to medications.  Please feel free to call me with any questions about the care of this patient.

## 2024-12-17 NOTE — Clinical Note
Dr. Grant,  Please see my addendum from today's Behavioral Health Integration meeting with Negro, this patient's BHI therapist.  Please let me know your thoughts.  Would you like to see him back in clinic to discuss or reach out to him?    Just a reminder that I have not met this patient face to face and that the PCP maintains responsibility for discussing plan with patient and prescribing medication.    Please reach out to me any time with questions or concerns.  Juana Diaz

## 2024-12-18 ENCOUNTER — TELEPHONE (OUTPATIENT)
Dept: PRIMARY CARE CLINIC | Facility: CLINIC | Age: 59
End: 2024-12-18
Payer: OTHER GOVERNMENT

## 2024-12-18 NOTE — TELEPHONE ENCOUNTER
----- Message from Danny Grant MD sent at 12/17/2024  5:31 PM CST -----  Please contact patient to schedule virtual visit to discuss possible medication changes per Psychiatry.  ----- Message -----  From: Juana Diaz MD  Sent: 12/17/2024   3:12 PM CST  To: MD Dr. Juanita Petersen,    Please see my addendum from today's Behavioral Health Integration meeting with Negro, this patient's BHI therapist.  Please let me know your thoughts.  Would you like to see him back in clinic to discuss or reach out to him?      Just a reminder that I have not met this patient face to face and that the PCP maintains responsibility for discussing plan with patient and prescribing medication.      Please reach out to me any time with questions or concerns.    Juana Diaz

## 2024-12-19 ENCOUNTER — PATIENT MESSAGE (OUTPATIENT)
Dept: ADMINISTRATIVE | Facility: HOSPITAL | Age: 59
End: 2024-12-19
Payer: OTHER GOVERNMENT

## 2025-01-06 DIAGNOSIS — I10 ESSENTIAL (PRIMARY) HYPERTENSION: ICD-10-CM

## 2025-01-06 RX ORDER — MELOXICAM 15 MG/1
TABLET ORAL
Qty: 30 TABLET | Refills: 0 | Status: SHIPPED | OUTPATIENT
Start: 2025-01-06

## 2025-01-06 RX ORDER — IRBESARTAN 300 MG/1
TABLET ORAL
Qty: 90 TABLET | Refills: 0 | Status: SHIPPED | OUTPATIENT
Start: 2025-01-06

## 2025-01-06 NOTE — TELEPHONE ENCOUNTER
Refill Routing Note   Medication(s) are not appropriate for processing by [facility] for the following reason(s):        Required vitals abnormal    [facility] action(s):  Defer             Appointments  past 12m or future 3m with PCP    Date Provider   Last Visit   11/13/2024 Danny Grant MD   Next Visit   1/15/2025 Danny Grant MD   ED visits in past 90 days: 0        Note composed:4:04 PM 01/06/2025

## 2025-01-06 NOTE — TELEPHONE ENCOUNTER
No care due was identified.  [facility] Embedded Care Due Messages. Reference number: [id number].   1/06/2025 3:43:01 AM CST

## 2025-01-08 ENCOUNTER — OFFICE VISIT (OUTPATIENT)
Dept: PODIATRY | Facility: CLINIC | Age: 60
End: 2025-01-08
Payer: OTHER GOVERNMENT

## 2025-01-08 VITALS — BODY MASS INDEX: 29.19 KG/M2 | HEIGHT: 69 IN | WEIGHT: 197.06 LBS

## 2025-01-08 DIAGNOSIS — M76.62 TENDONITIS, ACHILLES, LEFT: Primary | ICD-10-CM

## 2025-01-08 PROCEDURE — 99213 OFFICE O/P EST LOW 20 MIN: CPT | Mod: PBBFAC,PO | Performed by: PODIATRIST

## 2025-01-08 PROCEDURE — 99999 PR PBB SHADOW E&M-EST. PATIENT-LVL III: CPT | Mod: PBBFAC,,, | Performed by: PODIATRIST

## 2025-01-08 PROCEDURE — 99213 OFFICE O/P EST LOW 20 MIN: CPT | Mod: S$PBB,,, | Performed by: PODIATRIST

## 2025-01-08 NOTE — PROGRESS NOTES
Subjective:      Patient ID: Jose Carlos Worthington is a 59 y.o. male.    Chief Complaint: Follow-up    Jose Carlos is a 59 y.o. male who presents to the podiatry clinic  with complaint of  left foot pain posterior heel. Onset of the symptoms was  a few weeks ago . Precipitating event: Believes it may have started after walking up a steep incline getting [place]. Current symptoms include: ability to bear weight, but with some pain and worsening symptoms after a period of activity. Aggravating factors: any weight bearing. Symptoms have gradually worsened. Patient has had no prior foot problems. Evaluation to date: x-rays posterior heel spur. Treatment to date: none. Patients rates pain 7/10 on pain scale.    1/8/25: Patient returns for follow up, the meloxicam helps and the stretching helps but the pain is still present he cannot run and can play golf but with some pain.     Review of Systems   Constitutional: Negative for chills and fever.   Cardiovascular:  Negative for claudication and leg swelling.   Respiratory:  Negative for shortness of breath.    Skin:  Negative for itching, nail changes and rash.   Musculoskeletal:  Negative for muscle cramps, muscle weakness and myalgias.        Left heel pain   Gastrointestinal:  Negative for nausea and vomiting.   Neurological:  Negative for focal weakness, loss of balance, numbness and paresthesias.           Objective:      Physical Exam  Constitutional:       General: He is not in acute distress.     Appearance: He is well-developed. He is not diaphoretic.   Cardiovascular:      Pulses:           Dorsalis pedis pulses are 2+ on the right side and 2+ on the left side.        Posterior tibial pulses are 2+ on the right side and 2+ on the left side.      Comments: < 3 sec capillary refill time to toes 1-5 bilateral. Toes and feet are warm to touch proximally with normal distal cooling b/l. There is some hair growth on the feet and toes b/l. There is no  "edema b/l. No spider veins or varicosities present b/l.     Musculoskeletal:      Comments: Equinus noted b/l ankles with < 10 deg DF noted. MMT 5/5 in DF/PF/Inv/Ev resistance with no reproduction of pain in any direction. Passive range of motion of ankle and pedal joints is painless b/l.    Tenderness to palpation at the distal achilles and the attachment on the calcaneus more to the lateral aspect of the heel, some tightness and discomfort with DF end ROM   Skin:     General: Skin is warm and dry.      Coloration: Skin is not pale.      Findings: No abrasion, bruising, burn, ecchymosis, erythema, laceration, lesion, petechiae or rash.      Nails: There is no clubbing.      Comments: Skin temperature, texture and turgor within normal limits.   Neurological:      Mental Status: He is alert and oriented to person, place, and time.      Sensory: No sensory deficit.      Motor: No tremor, atrophy or abnormal muscle tone.      Comments: Negative tinel sign bilateral.   Psychiatric:         Behavior: Behavior normal.               Assessment:       Encounter Diagnosis   Name Primary?    Tendonitis, Achilles, left Yes           Plan:       Jose Carlos Burrell" was seen today for follow-up.    Diagnoses and all orders for this visit:    Tendonitis, Achilles, left  -     Ambulatory Referral/Consult to Physical Therapy; Future        I counseled the patient on his conditions, their implications and medical management.    The patient was advised that NSAID-type medications have two very important potential side effects: gastrointestinal irritation including hemorrhage and renal injuries. He was asked to take the medication with food and to stop if he experiences any GI upset.     Patient will continue to stretch the tendo achilles complex three times daily as demonstrated in the office.  Literature was dispensed illustrating proper stretching technique.    Will start with PT    Can get an MRI if conservative care is not " helping    Return in 2 months for follow up PT    Robin Cruz DPM

## 2025-01-17 ENCOUNTER — PATIENT MESSAGE (OUTPATIENT)
Dept: BEHAVIORAL HEALTH | Facility: CLINIC | Age: 60
End: 2025-01-17
Payer: OTHER GOVERNMENT

## 2025-01-29 ENCOUNTER — PATIENT MESSAGE (OUTPATIENT)
Dept: PRIMARY CARE CLINIC | Facility: CLINIC | Age: 60
End: 2025-01-29

## 2025-01-29 ENCOUNTER — OFFICE VISIT (OUTPATIENT)
Dept: PRIMARY CARE CLINIC | Facility: CLINIC | Age: 60
End: 2025-01-29
Payer: OTHER GOVERNMENT

## 2025-01-29 VITALS — DIASTOLIC BLOOD PRESSURE: 85 MMHG | SYSTOLIC BLOOD PRESSURE: 153 MMHG

## 2025-01-29 DIAGNOSIS — I10 ESSENTIAL (PRIMARY) HYPERTENSION: Primary | ICD-10-CM

## 2025-01-29 DIAGNOSIS — I10 ESSENTIAL (PRIMARY) HYPERTENSION: ICD-10-CM

## 2025-01-29 RX ORDER — CARVEDILOL 6.25 MG/1
6.25 TABLET ORAL 2 TIMES DAILY
Qty: 60 TABLET | Refills: 0 | Status: SHIPPED | OUTPATIENT
Start: 2025-01-29

## 2025-01-29 NOTE — PROGRESS NOTES
Assessment:       1. Essential (primary) hypertension        Plan:       Essential (primary) hypertension  -     carvediloL (COREG) 6.25 MG tablet; Take 1 tablet (6.25 mg total) by mouth 2 (two) times daily.  Dispense: 60 tablet; Refill: 0      Assessment & Plan    - Reviewed patient's blood pressure readings, noting inadequate control with current regimen  - Evaluated side effects of hydralazine, including bloating, swallowing difficulty, and shortness of breath  - Discontinued hydralazine due to adverse effects and insufficient blood pressure control  - Considered alternative medication options to optimize blood pressure management  - Selected Carvedilol as a replacement, starting at a low dose with potential for titration    HYPERTENSION:   Monitored the patient's blood pressure readings, which were in the mid-upper 140s for the first 2 weeks after starting hydralazine, but then increased to the low 150s.   Current reading is 153/154 over 85.   Evaluated that the patient's blood pressure is still not controlled despite the addition of hydralazine.   Assessed the need to change medication due to uncontrolled blood pressure and side effects from hydralazine.   Discontinued hydralazine.   Initiated Carvedilol 6.25 mg twice daily.   Continued irbesartan and spironolactone.   Instructed the patient to contact the office in 2 weeks to assess blood pressure control on new regimen.   Advised follow-up if Carvedilol dose titration is needed based on response.    ADVERSE DRUG REACTION:   Monitored patient's reported side effects from hydralazine, including bloating, feeling of swollen throat, dysphagia, and dyspnea, lasting for a few hours after taking the medication.   Evaluated that the patient is experiencing potentially adverse side effects to hydralazine.   Assessed the need to change medication to avoid significant side effects while controlling blood pressure.   Discontinued hydralazine due to adverse effects and  switched to Carvedilol 6.25 mg twice daily.   Educated the patient that any antihypertensive medication may cause dizziness or lightheadedness.    POST-OPERATIVE INFECTION:   Noted that the patient recently underwent oral surgery with bone grafts, one of which became infected.   Confirmed that the patient completed a course of antibiotics for the secondary infection 3 days ago.        Medication List with Changes/Refills   New Medications    CARVEDILOL (COREG) 6.25 MG TABLET    Take 1 tablet (6.25 mg total) by mouth 2 (two) times daily.   Current Medications    AZELASTINE (ASTELIN) 137 MCG (0.1 %) NASAL SPRAY        CETIRIZINE (ZYRTEC) 10 MG TABLET    Take 1 tablet (10 mg total) by mouth once daily.    IRBESARTAN (AVAPRO) 300 MG TABLET    TAKE 1 TABLET(300 MG) BY MOUTH DAILY    MELOXICAM (MOBIC) 15 MG TABLET    TAKE 1 TABLET(15 MG) BY MOUTH DAILY    SERTRALINE (ZOLOFT) 25 MG TABLET    Take 1 tablet (25 mg total) by mouth once daily.    SPIRONOLACTONE (ALDACTONE) 25 MG TABLET    Take 1 tablet (25 mg total) by mouth once daily.    TRAZODONE (DESYREL) 50 MG TABLET    1-2 tablets at bedtime as needed for insomnia   Discontinued Medications    BENZONATATE (TESSALON) 200 MG CAPSULE        DOXYCYCLINE (VIBRA-TABS) 100 MG TABLET        HYDRALAZINE (APRESOLINE) 25 MG TABLET    Take 1 tablet (25 mg total) by mouth 2 (two) times a day.         Subjective:       Patient ID: Jose Carlos Worthington is a 59 y.o. male.    Chief Complaint: No chief complaint on file.      HPI  History of Present Illness    CHIEF COMPLAINT:  Patient presents today for follow up of blood pressure management    HYPERTENSION:  He reports initial improvement in blood pressure readings to mid 140s after starting Hydralazine, but subsequently returned to low 150s (153-154/85). He experiences multiple side effects from Hydralazine including bloating lasting from morning dose until early afternoon, sensation of swelling with difficulty swallowing, and morning  shortness of breath requiring increased respiratory effort.    RECENT MEDICAL HISTORY:  He recently underwent oral surgery with bone grafts, one of which became infected requiring antibiotics. He completed the antibiotic course 3 days ago. He has stopped exercising due to a heel injury.      ROS:  Respiratory: +shortness of breath       The patient location is: [place]  The chief complaint leading to consultation is: blood pressure    Visit type: audiovisual    Face to Face time with patient: 11 minutes  15 minutes of total time spent on the encounter, which includes face to face time and non-face to face time preparing to see the patient (eg, review of tests), Obtaining and/or reviewing separately obtained history, Documenting clinical information in the electronic or other health record, Independently interpreting results (not separately reported) and communicating results to the patient/family/caregiver, or Care coordination (not separately reported).         Each patient to whom he or she provides medical services by telemedicine is:  (1) informed of the relationship between the physician and patient and the respective role of any other health care provider with respect to management of the patient; and (2) notified that he or she may decline to receive medical services by telemedicine and may withdraw from such care at any time.    Notes:    Review of Systems   Eyes:  Negative for visual disturbance.   Respiratory:  Negative for shortness of breath.    Cardiovascular:  Negative for chest pain.   Neurological:  Negative for headaches.       Objective:      Vitals:    01/29/25 1400   BP: (!) 153/85     BP Readings from Last 5 Encounters:   01/29/25 (!) 153/85   12/17/24 (!) 154/89   11/13/24 (!) 162/83   10/10/24 (!) 152/78   08/04/23 137/78     Wt Readings from Last 5 Encounters:   01/08/25 89.4 kg (197 lb 1.5 oz)   12/06/24 89.4 kg (197 lb 1.5 oz)   10/10/24 89.4 kg (197 lb 1.5 oz)   08/03/23 88.9 kg (196 lb)    04/11/23 89 kg (196 lb 3.4 oz)     Physical Exam  Constitutional:       General: He is not in acute distress.     Appearance: Normal appearance. He is well-developed.   Pulmonary:      Effort: No respiratory distress.   Neurological:      Mental Status: He is alert and oriented to person, place, and time.   Psychiatric:         Behavior: Behavior normal.         Lab Results   Component Value Date    WBC 5.35 11/07/2024    HGB 16.0 11/07/2024    HCT 45.8 11/07/2024     11/07/2024    CHOL 224 (H) 11/07/2024    TRIG 86 11/07/2024    HDL 70 11/07/2024    ALT 29 11/07/2024    AST 25 11/07/2024     11/07/2024    K 4.1 11/07/2024     11/07/2024    CREATININE 1.1 11/07/2024    BUN 9 11/07/2024    CO2 23 11/07/2024    TSH 2.237 11/07/2024    PSA 0.51 11/07/2024    HGBA1C 5.0 11/07/2024       This note was generated with the assistance of ambient listening technology. Verbal consent was obtained by the patient and accompanying visitor(s) for the recording of patient appointment to facilitate this note. I attest to having reviewed and edited the generated note for accuracy, though some syntax or spelling errors may persist. Please contact the author of this note for any clarification.        Humira Counseling:  I discussed with the patient the risks of adalimumab including but not limited to myelosuppression, immunosuppression, autoimmune hepatitis, demyelinating diseases, lymphoma, and serious infections.  The patient understands that monitoring is required including a PPD at baseline and must alert us or the primary physician if symptoms of infection or other concerning signs are noted.

## 2025-02-05 RX ORDER — MELOXICAM 15 MG/1
TABLET ORAL
Qty: 30 TABLET | Refills: 0 | Status: SHIPPED | OUTPATIENT
Start: 2025-02-05

## 2025-02-10 DIAGNOSIS — I10 ESSENTIAL (PRIMARY) HYPERTENSION: ICD-10-CM

## 2025-02-10 RX ORDER — SPIRONOLACTONE 25 MG/1
TABLET ORAL
Qty: 90 TABLET | Refills: 3 | Status: SHIPPED | OUTPATIENT
Start: 2025-02-10

## 2025-02-10 NOTE — TELEPHONE ENCOUNTER
Refill Routing Note   Medication(s) are not appropriate for processing by [facility] for the following reason(s):        Required vitals abnormal    [facility] action(s):  Defer               Appointments  past 12m or future 3m with PCP    Date Provider   Last Visit   1/29/2025 Danny Grant MD   Next Visit   Visit date not found Danny Grant MD   ED visits in past 90 days: 0        Note composed:1:13 PM 02/10/2025

## 2025-02-10 NOTE — TELEPHONE ENCOUNTER
No care due was identified.  Health [facility] Embedded Care Due Messages. Reference number: [id number].   2/10/2025 3:44:41 AM CST

## 2025-02-12 DIAGNOSIS — F51.04 PSYCHOPHYSIOLOGICAL INSOMNIA: ICD-10-CM

## 2025-02-12 RX ORDER — TRAZODONE HYDROCHLORIDE 50 MG/1
TABLET ORAL
Qty: 60 TABLET | Refills: 11 | Status: SHIPPED | OUTPATIENT
Start: 2025-02-12

## 2025-02-12 NOTE — TELEPHONE ENCOUNTER
Refill Decision Note   Jose Carlos Ander  is requesting a refill authorization.  Brief Assessment and Rationale for Refill:  Approve     Medication Therapy Plan:        Comments:     Note composed:7:55 AM 02/12/2025

## 2025-02-12 NOTE — TELEPHONE ENCOUNTER
No care due was identified.  Health [facility] Embedded Care Due Messages. Reference number: [id number].   2/12/2025 3:41:57 AM CST

## 2025-02-17 DIAGNOSIS — I10 ESSENTIAL (PRIMARY) HYPERTENSION: ICD-10-CM

## 2025-02-17 RX ORDER — CARVEDILOL 12.5 MG/1
12.5 TABLET ORAL 2 TIMES DAILY
Qty: 60 TABLET | Refills: 0 | Status: SHIPPED | OUTPATIENT
Start: 2025-02-17

## 2025-02-17 NOTE — TELEPHONE ENCOUNTER
No care due was identified.  Health [facility] Embedded Care Due Messages. Reference number: [id number].   2/17/2025 5:49:04 PM CST

## 2025-02-18 ENCOUNTER — TELEPHONE (OUTPATIENT)
Dept: BEHAVIORAL HEALTH | Facility: CLINIC | Age: 60
End: 2025-02-18
Payer: OTHER GOVERNMENT

## 2025-02-18 RX ORDER — CARVEDILOL 12.5 MG/1
12.5 TABLET ORAL 2 TIMES DAILY
Qty: 180 TABLET | OUTPATIENT
Start: 2025-02-18

## 2025-02-18 NOTE — TELEPHONE ENCOUNTER
Refill Decision Note   Jose Carlos Ander  is requesting a refill authorization.  Brief Assessment and Rationale for Refill:  Quick Discontinue     Medication Therapy Plan:        Comments:     Note composed:9:22 AM 02/18/2025

## 2025-02-18 NOTE — PROGRESS NOTES
[facility] reached out to pt to reschedule an appt. No answer, LVM and sent portal message with upcoming appts to reschedule.

## 2025-02-19 ENCOUNTER — OFFICE VISIT (OUTPATIENT)
Dept: DERMATOLOGY | Facility: CLINIC | Age: 60
End: 2025-02-19
Payer: OTHER GOVERNMENT

## 2025-02-19 DIAGNOSIS — L71.9 ROSACEA: ICD-10-CM

## 2025-02-19 DIAGNOSIS — L57.8 ACTINIC SKIN DAMAGE: ICD-10-CM

## 2025-02-19 DIAGNOSIS — L30.9 ECZEMA, UNSPECIFIED TYPE: ICD-10-CM

## 2025-02-19 DIAGNOSIS — D48.5 NEOPLASM OF UNCERTAIN BEHAVIOR OF SKIN: Primary | ICD-10-CM

## 2025-02-19 PROCEDURE — 99213 OFFICE O/P EST LOW 20 MIN: CPT | Mod: PBBFAC,PN | Performed by: DERMATOLOGY

## 2025-02-19 PROCEDURE — 11102 TANGNTL BX SKIN SINGLE LES: CPT | Mod: PBBFAC,PN | Performed by: DERMATOLOGY

## 2025-02-19 PROCEDURE — 88305 TISSUE EXAM BY PATHOLOGIST: CPT | Performed by: PATHOLOGY

## 2025-02-19 RX ORDER — DOXYCYCLINE HYCLATE 20 MG
20 TABLET ORAL 2 TIMES DAILY
Qty: 60 TABLET | Refills: 2 | Status: SHIPPED | OUTPATIENT
Start: 2025-02-19

## 2025-02-19 RX ORDER — FLUOCINONIDE 0.5 MG/G
OINTMENT TOPICAL 2 TIMES DAILY
Qty: 30 G | Refills: 2 | Status: SHIPPED | OUTPATIENT
Start: 2025-02-19

## 2025-02-19 RX ORDER — SULFACETAMIDE SODIUM, SULFUR 100; 50 MG/G; MG/G
EMULSION TOPICAL
Qty: 170 G | Refills: 3 | Status: SHIPPED | OUTPATIENT
Start: 2025-02-19

## 2025-02-19 RX ORDER — METRONIDAZOLE 7.5 MG/G
CREAM TOPICAL 2 TIMES DAILY
Qty: 45 G | Refills: 3 | Status: SHIPPED | OUTPATIENT
Start: 2025-02-19 | End: 2026-02-19

## 2025-02-19 NOTE — PROGRESS NOTES
Subjective:      Patient ID:  Jose Carlos Worthington is a 59 y.o. male who presents for   Chief Complaint   Patient presents with    Rosacea    Eczema     Rosacea    Eczema    Pt c/o rosacea on face x many years. Tx in the past with unknown tx. Initially took a pill which helped, but he only took it for about 1.5 wks. Also used a topical solution to clean his skin.  Face does better in the cold weather, flares with heat.  He gets frustrated with the way the skin on his face looks.    Pt c/o eczema on both lower legs x many years. Tx with unknown ointment, which helped more than a cream which was previously prescribed.  Cold flares this rash.    Recently retired from [employer] yrs. States he spent his whole life in the sun.   His last dermatologist is now out of network.  States he hit his head on a screw in Dec and it hasn't healed.    Review of Systems   HENT:  Negative for headaches.    Eyes:  Negative for itching, eye watering, eye irritation and eyelid inflammation.   Gastrointestinal:  Negative for nausea, vomiting, diarrhea and Sensitivity to oral antibiotics.        Vascular instability syndrome: rosacea associated with GI sx and HA's   Skin:  Positive for activity-related sunscreen use. Negative for daily sunscreen use and recent sunburn.   Neurological:  Negative for headaches.       Objective:   Physical Exam   Constitutional: He appears well-developed and well-nourished. No distress.   Neurological: He is alert and oriented to person, place, and time. He is not disoriented.   Psychiatric: He has a normal mood and affect.   Skin:   Areas Examined (abnormalities noted in diagram):   Head / Face Inspection Performed  Neck Inspection Performed  Chest / Axilla Inspection Performed  Back Inspection Performed  RUE Inspected  LUE Inspection Performed  RLE Inspected  LLE Inspection Performed                 Diagram Legend     Erythematous scaling macule/papule c/w actinic keratosis       Vascular papule c/w  angioma      Pigmented verrucoid papule/plaque c/w seborrheic keratosis      Yellow umbilicated papule c/w sebaceous hyperplasia      Irregularly shaped tan macule c/w lentigo     1-2 mm smooth white papules consistent with Milia      Movable subcutaneous cyst with punctum c/w epidermal inclusion cyst      Subcutaneous movable cyst c/w pilar cyst      Firm pink to brown papule c/w dermatofibroma      Pedunculated fleshy papule(s) c/w skin tag(s)      Evenly pigmented macule c/w junctional nevus     Mildly variegated pigmented, slightly irregular-bordered macule c/w mildly atypical nevus      Flesh colored to evenly pigmented papule c/w intradermal nevus       Pink pearly papule/plaque c/w basal cell carcinoma      Erythematous hyperkeratotic cursted plaque c/w SCC      Surgical scar with no sign of skin cancer recurrence      Open and closed comedones      Inflammatory papules and pustules      Verrucoid papule consistent consistent with wart     Erythematous eczematous patches and plaques     Dystrophic onycholytic nail with subungual debris c/w onychomycosis     Umbilicated papule    Erythematous-base heme-crusted tan verrucoid plaque consistent with inflamed seborrheic keratosis     Erythematous Silvery Scaling Plaque c/w Psoriasis     See annotation      Assessment / Plan:    Neoplasm of uncertain behavior of skin  Shave biopsy procedure note:    Shave biopsy performed after verbal consent including risk of infection, scar, recurrence, need for additional treatment of site. Area prepped with alcohol, anesthetized with approximately 1.0cc of 1% lidocaine with epinephrine. Lesional tissue shaved with razor blade. Hemostasis achieved with application of aluminum chloride followed by hyfrecation. No complications. Dressing applied. Wound care explained.    -     Specimen to Pathology, Dermatology  Pathology Orders:       Normal Orders This Visit    Specimen to Pathology, Dermatology     Comments:    Number of  Specimens:->1  ------------------------->-------------------------  Spec 1 Procedure:->Biopsy  Spec 1 Clinical Impression:->r/o BCC vs other  Spec 1 Source:->L lateral forehead    Questions:    Procedure Type: Dermatology and skin neoplasms    Number of Specimens: 1    ------------------------: -------------------------    Spec 1 Procedure: Biopsy    Spec 1 Clinical Impression: r/o BCC vs other    Spec 1 Source: L lateral forehead    Release to patient:           Rosacea  -     doxycycline (PERIOSTAT) 20 MG tablet; Take 1 tablet (20 mg total) by mouth 2 (two) times daily. With food and large glass of water. Remain upright x 1 hour after taking. Use daily sunscreen.  Dispense: 60 tablet; Refill: 2  -     sulfacetamide sodium-sulfur 10-5 % (w/w) Clsr; Wash affected areas daily.  Dispense: 170 g; Refill: 3  -     metronidazole 0.75% (METROCREAM) 0.75 % Crea; Apply topically 2 (two) times daily. To affected areas on face  Dispense: 45 g; Refill: 3  Discussed benefits and risks of doxycyline therapy including but not limited to GI discomfort, esophageal irritation/ulceration, and increased sun sensitivity. Patient was counseled to take medicine with meals and at least 1 hour before lying down.     Eczema, unspecified type  -     fluocinonide (LIDEX) 0.05 % ointment; Apply topically 2 (two) times daily. To affected areas on lower leg  x 1-2 wks then prn flares only  Dispense: 30 g; Refill: 2  You have been prescribed a topical steroid. There are possible side effects from overuse of topical steroids, including thinning of skin, lightening of skin, easy tearing/bruising of skin, stretch marks, etc. It is best to only use it when it's needed, preferably for no more than 2 weeks at a time to the same area, and to take breaks from the topical steroid, especially if any of the above side effects are noticed.  Good skin care regimen discussed including limiting to one bath or shower/day, using lukewarm water with mild soap and  "moisturizing cream to skin 1 - 2x/day. Brochure was provided and reviewed with patient.  Recommended CeraVe moisturizing cream, Dove sensitive skin soap, and "free and clear" detergents.    Actinic skin damage  Once rosacea has been treated, will re-evaluate to see if any AKs present.    Patient instructed in importance of daily broad spectrum sunscreen use with spf at least 30. Sun avoidance and topical protection/protective clothing discussed.    Follow up in about 2 months (around 4/19/2025) for skin check or sooner pending biopsy results.  "

## 2025-02-19 NOTE — PATIENT INSTRUCTIONS
XEROSIS (DRY SKIN)        Definition    Xerosis is the term for dry skin.  We all have a natural oil coating over our skin produced by the skin oil glands.  If this oil is removed, the skin becomes dry which can lead to cracking, which can lead to inflammation.  Xerosis is usually a long-term problem that recurs often, especially in the winter.    Cause    Long hot baths or showers can remove our natural oil and lead to xerosis.  One should never take more than one bath or shower a day and for no longer than ten minutes.  Use of harsh soaps such as Zest, Dial, and Ivory can worsen and cause xerosis.  Cold winter weather worsens xerosis because the amount of moisture contained in cold air is much less than the amount of moisture in warm air.    Treatment    Treatment is intended to restore the natural oil to your skin.  Keep the skin lubricated.    Do not take more than one bath or shower a day.  Use lukewarm water, not hot.  Hot water dries out the skin.    Use a gentle moisturizing soap such as Cetaphil soap, Oil of Olay, Dove, Basis, Ivory moisture care, Restoraderm cleanser.    When toweling dry, dont rub.  Blot the skin so there is still some water left on the skin.  You should apply a moisturizing cream to all of the skin such as Cerave moisturizing cream, Cetaphil cream, Lipikar [place] AP+ Intense Repair Moisturizing Cream or Restoraderm or Eucerin Original Formula cream.   Alpha hydroxyacid lotions, i.e., AmLactin, also work very well for preventing dry skin, but may burn when used on inflamed or reddened skin.    If you like to swim during the winter months, you should not use soap when getting out of the pool.  When you have finished swimming, rinse off the chlorine with cool to warm water.  If this will be the only shower of the day, then you may use Cetaphil or another mild soap to cleanse your skin.  After the shower, apply a moisturizing cream to all of the skin as above.        [address],  [place] [address]/ [phone] FAX/ [email or web address]

## 2025-02-21 ENCOUNTER — PATIENT MESSAGE (OUTPATIENT)
Dept: ADMINISTRATIVE | Facility: HOSPITAL | Age: 60
End: 2025-02-21
Payer: OTHER GOVERNMENT

## 2025-02-21 LAB
FINAL PATHOLOGIC DIAGNOSIS: NORMAL
GROSS: NORMAL
Lab: NORMAL
MICROSCOPIC EXAM: NORMAL

## 2025-02-22 ENCOUNTER — RESULTS FOLLOW-UP (OUTPATIENT)
Dept: DERMATOLOGY | Facility: CLINIC | Age: 60
End: 2025-02-22
Payer: OTHER GOVERNMENT

## 2025-02-24 ENCOUNTER — TELEPHONE (OUTPATIENT)
Dept: DERMATOLOGY | Facility: CLINIC | Age: 60
End: 2025-02-24
Payer: OTHER GOVERNMENT

## 2025-02-24 ENCOUNTER — PATIENT MESSAGE (OUTPATIENT)
Dept: BEHAVIORAL HEALTH | Facility: CLINIC | Age: 60
End: 2025-02-24
Payer: OTHER GOVERNMENT

## 2025-02-24 NOTE — TELEPHONE ENCOUNTER
[facility] for patient to return Dr. Leyva's office for scheduling for Mohs surgery for a BCC, left lateral forehead. Referral from Dr. Camila Kidd.

## 2025-02-26 DIAGNOSIS — I10 ESSENTIAL (PRIMARY) HYPERTENSION: ICD-10-CM

## 2025-02-26 RX ORDER — CARVEDILOL 6.25 MG/1
6.25 TABLET ORAL
Qty: 60 TABLET | Refills: 0 | OUTPATIENT
Start: 2025-02-26

## 2025-02-26 NOTE — TELEPHONE ENCOUNTER
No care due was identified.  Health [facility] Embedded Care Due Messages. Reference number: [id number].   2/26/2025 3:41:02 AM CST

## 2025-02-26 NOTE — TELEPHONE ENCOUNTER
Refill Decision Note  Brian DC. Inappropriate Request     Jose Carlos Worthington  is requesting a refill authorization.  Brief Assessment and Rationale for Refill:  Quick Discontinue     Medication Therapy Plan:  prescription was reordered on 2/17/2025 by Danny Grant MD DOSE INCREASED TO 12.5 MG BID    Medication Reconciliation Completed: No   Comments:           Note composed:9:20 AM 02/26/2025

## 2025-03-14 ENCOUNTER — OFFICE VISIT (OUTPATIENT)
Dept: PODIATRY | Facility: CLINIC | Age: 60
End: 2025-03-14
Payer: OTHER GOVERNMENT

## 2025-03-14 VITALS — HEIGHT: 69 IN | BODY MASS INDEX: 29.19 KG/M2 | WEIGHT: 197.06 LBS

## 2025-03-14 DIAGNOSIS — M76.62 TENDONITIS, ACHILLES, LEFT: Primary | ICD-10-CM

## 2025-03-14 DIAGNOSIS — B35.1 ONYCHOMYCOSIS DUE TO DERMATOPHYTE: ICD-10-CM

## 2025-03-14 PROCEDURE — 99213 OFFICE O/P EST LOW 20 MIN: CPT | Mod: PBBFAC,PO | Performed by: PODIATRIST

## 2025-03-14 PROCEDURE — 99999 PR PBB SHADOW E&M-EST. PATIENT-LVL III: CPT | Mod: PBBFAC,,, | Performed by: PODIATRIST

## 2025-03-14 RX ORDER — DIAZEPAM 10 MG/1
10 TABLET ORAL
COMMUNITY
Start: 2025-01-02

## 2025-03-14 RX ORDER — IBUPROFEN 600 MG/1
600 TABLET ORAL EVERY 8 HOURS
COMMUNITY
Start: 2025-01-02

## 2025-03-14 RX ORDER — HYDROCODONE BITARTRATE AND ACETAMINOPHEN 5; 325 MG/1; MG/1
1 TABLET ORAL EVERY 6 HOURS PRN
COMMUNITY
Start: 2025-01-13

## 2025-03-14 NOTE — PROGRESS NOTES
Subjective:      Patient ID: Jose Carlos Worthington is a 60 y.o. male.    Chief Complaint: Foot Pain and Ankle Pain    Jose Carlos is a 60 y.o. male who presents to the podiatry clinic  with complaint of  left foot pain posterior heel. Onset of the symptoms was  a few weeks ago . Precipitating event: Believes it may have started after walking up a steep incline getting out of [place]. Current symptoms include: ability to bear weight, but with some pain and worsening symptoms after a period of activity. Aggravating factors: any weight bearing. Symptoms have gradually worsened. Patient has had no prior foot problems. Evaluation to date: x-rays posterior heel spur. Treatment to date: none. Patients rates pain 7/10 on pain scale.    1/8/25: Patient returns for follow up, the meloxicam helps and the stretching helps but the pain is still present he cannot run and can play golf but with some pain.     3/14/25: Patient returns for follow up left achilles tendonitis, he has been doing PT and feels the dry needling has helped a lot, he has had little to no pain over the last couple weeks and even golfed the other day with minimal discomfort. Has a question about nails that are becoming thick and discolored    Review of Systems   Constitutional: Negative for chills and fever.   Cardiovascular:  Negative for claudication and leg swelling.   Respiratory:  Negative for shortness of breath.    Skin:  Negative for itching, nail changes and rash.   Musculoskeletal:  Negative for muscle cramps, muscle weakness and myalgias.        Left heel pain   Gastrointestinal:  Negative for nausea and vomiting.   Neurological:  Negative for focal weakness, loss of balance, numbness and paresthesias.           Objective:      Physical Exam  Constitutional:       General: He is not in acute distress.     Appearance: He is well-developed. He is not diaphoretic.   Cardiovascular:      Pulses:           Dorsalis pedis pulses are 2+ on the  "right side and 2+ on the left side.        Posterior tibial pulses are 2+ on the right side and 2+ on the left side.      Comments: < 3 sec capillary refill time to toes 1-5 bilateral. Toes and feet are warm to touch proximally with normal distal cooling b/l. There is some hair growth on the feet and toes b/l. There is no edema b/l. No spider veins or varicosities present b/l.     Musculoskeletal:      Comments: Equinus noted b/l ankles with < 10 deg DF noted. MMT 5/5 in DF/PF/Inv/Ev resistance with no reproduction of pain in any direction. Passive range of motion of ankle and pedal joints is painless b/l.    Tenderness to palpation at the distal achilles and the attachment on the calcaneus more to the lateral aspect of the heel, some tightness and discomfort with DF end ROM   Skin:     General: Skin is warm and dry.      Coloration: Skin is not pale.      Findings: No abrasion, bruising, burn, ecchymosis, erythema, laceration, lesion, petechiae or rash.      Nails: There is no clubbing.      Comments: Skin temperature, texture and turgor within normal limits.    Nails 1,2,5 are thick and discolored with slight subungual debris distally   Neurological:      Mental Status: He is alert and oriented to person, place, and time.      Sensory: No sensory deficit.      Motor: No tremor, atrophy or abnormal muscle tone.      Comments: Negative tinel sign bilateral.   Psychiatric:         Behavior: Behavior normal.               Assessment:       Encounter Diagnoses   Name Primary?    Tendonitis, Achilles, left Yes    Onychomycosis due to dermatophyte              Plan:       Jose Carlos Burrell" was seen today for foot pain and ankle pain.    Diagnoses and all orders for this visit:    Tendonitis, Achilles, left    Onychomycosis due to dermatophyte          I counseled the patient on his conditions, their implications and medical management.    The heel is doing better, continue PT until discharged and continue at home exercises " and stretching    Discussed treatment options for fungal toenails to include topical vs oral vs laser vs combined therapy in detail.    Prescribed CMPD Voriconazole 2.67%, Vancomycin 6.67% Nail Suspension in DMSO, apply twice daily to affected nails     Return PRN    Robin Cruz DPM

## 2025-03-16 DIAGNOSIS — I10 ESSENTIAL (PRIMARY) HYPERTENSION: ICD-10-CM

## 2025-03-16 NOTE — TELEPHONE ENCOUNTER
No care due was identified.  [facility] Embedded Care Due Messages. Reference number: [id number].   3/16/2025 3:43:35 AM CDT

## 2025-03-17 ENCOUNTER — PATIENT MESSAGE (OUTPATIENT)
Dept: PRIMARY CARE CLINIC | Facility: CLINIC | Age: 60
End: 2025-03-17
Payer: OTHER GOVERNMENT

## 2025-03-17 DIAGNOSIS — I10 ESSENTIAL (PRIMARY) HYPERTENSION: ICD-10-CM

## 2025-03-17 RX ORDER — HYDRALAZINE HYDROCHLORIDE 25 MG/1
25 TABLET, FILM COATED ORAL 2 TIMES DAILY
Qty: 180 TABLET | Refills: 3 | Status: SHIPPED | OUTPATIENT
Start: 2025-03-17 | End: 2025-03-20

## 2025-03-17 NOTE — TELEPHONE ENCOUNTER
Refill Routing Note   Medication(s) are not appropriate for processing by [facility] for the following reason(s):        Required vitals abnormal    [facility] action(s):  Defer        Medication Therapy Plan: 01/29/25 (!) 153/85      Appointments  past 12m or future 3m with PCP    Date Provider   Last Visit   1/29/2025 Danny Grant MD   Next Visit   Visit date not found Danny Grant MD   ED visits in past 90 days: 0        Note composed:6:18 PM 03/17/2025

## 2025-03-17 NOTE — TELEPHONE ENCOUNTER
No care due was identified.  [facility] Embedded Care Due Messages. Reference number: [id number].   3/17/2025 3:44:18 AM CDT

## 2025-03-17 NOTE — TELEPHONE ENCOUNTER
Refill Routing Note   Medication(s) are not appropriate for processing by [facility] for the following reason(s):        No active prescription written by provider    [facility] action(s):  Defer               Appointments  past 12m or future 3m with PCP    Date Provider   Last Visit   1/29/2025 Danny Grant MD   Next Visit   3/17/2025 Danny Grant MD   ED visits in past 90 days: 0        Note composed:11:58 AM 03/17/2025

## 2025-03-18 ENCOUNTER — TELEPHONE (OUTPATIENT)
Dept: PRIMARY CARE CLINIC | Facility: CLINIC | Age: 60
End: 2025-03-18
Payer: OTHER GOVERNMENT

## 2025-03-18 RX ORDER — CARVEDILOL 12.5 MG/1
12.5 TABLET ORAL 2 TIMES DAILY
Qty: 60 TABLET | Refills: 0 | Status: SHIPPED | OUTPATIENT
Start: 2025-03-18

## 2025-03-18 NOTE — TELEPHONE ENCOUNTER
----- Message from Diana sent at 3/17/2025  3:33 PM CDT -----  Contact: Pt  [phone]  Patient is returning a phone call.Who left a message for the patient: Jr patient know what this is regarding:  YesWould you like a call back, or a response through your [facility] portal?: YesComments:

## 2025-03-19 ENCOUNTER — TELEPHONE (OUTPATIENT)
Dept: PODIATRY | Facility: CLINIC | Age: 60
End: 2025-03-19
Payer: OTHER GOVERNMENT

## 2025-03-19 NOTE — TELEPHONE ENCOUNTER
----- Message from Sheree sent at 3/19/2025 12:52 PM CDT -----  Type:  Needs Medical AdviceWho Called: umesh Would the patient rather a call back or a response via [facility]? Call back Best Call Back Number: [phone] Information: umesh calling calling from [facility]. Medication sent over for pt on 03/14 isn't covered by insurance, sent over fax to change meds.     Please call back to advise. Thank you.   no

## 2025-03-20 RX ORDER — HYDRALAZINE HYDROCHLORIDE 50 MG/1
50 TABLET, FILM COATED ORAL 2 TIMES DAILY
Qty: 180 TABLET | Refills: 0 | Status: SHIPPED | OUTPATIENT
Start: 2025-03-20

## 2025-04-04 DIAGNOSIS — I10 ESSENTIAL (PRIMARY) HYPERTENSION: ICD-10-CM

## 2025-04-04 RX ORDER — IRBESARTAN 300 MG/1
TABLET ORAL
Qty: 90 TABLET | Refills: 2 | Status: SHIPPED | OUTPATIENT
Start: 2025-04-04

## 2025-04-04 NOTE — TELEPHONE ENCOUNTER
No care due was identified.  Health [facility] Embedded Care Due Messages. Reference number: [id number].   4/04/2025 3:41:32 AM CDT

## 2025-04-04 NOTE — TELEPHONE ENCOUNTER
Refill Routing Note   Medication(s) are not appropriate for processing by [facility] for the following reason(s):        Required vitals abnormal    [facility] action(s):  Defer               Appointments  past 12m or future 3m with PCP    Date Provider   Last Visit   1/29/2025 Danny Grant MD   Next Visit   Visit date not found Danny Grant MD   ED visits in past 90 days: 0        Note composed:12:10 PM 04/04/2025

## 2025-04-06 DIAGNOSIS — F43.10 PTSD (POST-TRAUMATIC STRESS DISORDER): ICD-10-CM

## 2025-04-06 RX ORDER — SERTRALINE HYDROCHLORIDE 25 MG/1
TABLET, FILM COATED ORAL
Qty: 90 TABLET | Refills: 3 | Status: SHIPPED | OUTPATIENT
Start: 2025-04-06

## 2025-04-06 NOTE — TELEPHONE ENCOUNTER
No care due was identified.  [facility] Embedded Care Due Messages. Reference number: [id number].   4/06/2025 7:17:11 AM CDT

## 2025-04-06 NOTE — TELEPHONE ENCOUNTER
Refill Decision Note   Jose Carlos Ander  is requesting a refill authorization.  Brief Assessment and Rationale for Refill:  Approve     Medication Therapy Plan:         Comments:     Note composed:5:47 PM 04/06/2025

## 2025-04-09 ENCOUNTER — PROCEDURE VISIT (OUTPATIENT)
Dept: DERMATOLOGY | Facility: CLINIC | Age: 60
End: 2025-04-09
Payer: OTHER GOVERNMENT

## 2025-04-09 VITALS — DIASTOLIC BLOOD PRESSURE: 73 MMHG | HEART RATE: 80 BPM | SYSTOLIC BLOOD PRESSURE: 153 MMHG

## 2025-04-09 DIAGNOSIS — C44.319 BASAL CELL CARCINOMA OF LEFT FOREHEAD: Primary | ICD-10-CM

## 2025-04-09 PROCEDURE — 17311 MOHS 1 STAGE H/N/HF/G: CPT | Mod: S$PBB,,, | Performed by: DERMATOLOGY

## 2025-04-09 PROCEDURE — 17312 MOHS ADDL STAGE: CPT | Mod: PBBFAC | Performed by: DERMATOLOGY

## 2025-04-09 PROCEDURE — 17311 MOHS 1 STAGE H/N/HF/G: CPT | Mod: PBBFAC | Performed by: DERMATOLOGY

## 2025-04-09 PROCEDURE — 13132 CMPLX RPR F/C/C/M/N/AX/G/H/F: CPT | Mod: S$PBB,51,, | Performed by: DERMATOLOGY

## 2025-04-09 PROCEDURE — 13132 CMPLX RPR F/C/C/M/N/AX/G/H/F: CPT | Mod: PBBFAC | Performed by: DERMATOLOGY

## 2025-04-09 PROCEDURE — 99499 UNLISTED E&M SERVICE: CPT | Mod: S$PBB,,, | Performed by: DERMATOLOGY

## 2025-04-09 PROCEDURE — 17312 MOHS ADDL STAGE: CPT | Mod: S$PBB,,, | Performed by: DERMATOLOGY

## 2025-04-09 NOTE — PROGRESS NOTES
PROCEDURE: Mohs' Micrographic Surgery    INDICATION: Location in non-mask areas of face where maximum conservation of tumor-free tissue is needed. Size > 1.0 cm on face. Biopsy-proven skin cancer of cosmetically and functionally important areas, including head, neck, genital, hand, foot, or areas known for having difficulty in healing, such as the lower anterior legs. Tumors with aggressive clinical behavior (rapidly growing, greater than 1 cm in diameter). Tumor with ill-defined borders.    REFERRING PROVIDER: Camila Kidd M.D.    CASE NUMBER:     ANESTHETIC: 4 cc 0.5% Lidocaine with Epi 1:200,000 mixed 1:1 with 0.5% Bupivacaine    SURGICAL PREP: Hibiclens    SURGEON: Bhakti Leyva MD    ASSISTANTS: Unique Ballard MA    PREOPERATIVE DIAGNOSIS: basal cell carcinoma- nodular    POSTOPERATIVE DIAGNOSIS: basal cell carcinoma- nodular, infiltrating    PATHOLOGIC DIAGNOSIS: basal cell carcinoma- nodular, infiltrating    HISTOLOGY OF SPECIMENS IN FIRST STAGE:   Debulking tumor confirms nodular and infiltrating basal cell carcinoma.  Tumor Type: Tumor seen. Nodular basal cell carcinoma: Nodular tumor in dermis composed of basaloid cells exhibiting peripheral palisading and retraction artifact.  Infiltrative basal cell carcinoma: Basaloid tumor in dermis characterized by thin elongated strands of basaloid cells infiltrating between dermal collagen bundles.   Depth of Invasion: epidermis and dermis  Perineural Invasion: No    HISTOLOGY OF SPECIMENS IN SUBSEQUENT STAGES:  Tumor Type:  No tumor seen.    STAGES OF MOHS' SURGERY PERFORMED: 2    TUMOR-FREE PLANE ACHIEVED: Yes    HEMOSTASIS: electrocoagulation and 4-0 vicryl suture ties     SPECIMENS: 3 (2 in stage A and 1 in stage B)    LOCATION: left lateral forehead (superior). Location verified with Dr. Kidd's clinical photograph. Patient also verified location with hand held mirror.    INITIAL LESION SIZE: 1.0 x 1.2 cm    FINAL DEFECT SIZE: 1.2 x 1.7 cm    WOUND  REPAIR/DISPOSITION: The patient tolerated Mohs' Micrographic Surgery for a basal cell carcinoma very well. When the tumor was completely removed, a repair of the surgical defect was undertaken.    PROCEDURE: Complex Linear Repair    INDICATION: Status post Mohs' Micrographic Surgery for basal cell carcinoma.    CASE NUMBER:     SURGEON: Bhakti Leyva MD    ASSISTANTS: Melinda Hu PA-C and Unique Ballard MA    ANESTHETIC: 3 cc 1% Lidocaine with Epinephrine 1:100,000    SURGICAL PREP: Hibiclens, prepped by Unique Ballard MA    LOCATION: left lateral forehead (superior)    DEFECT SIZE: 1.2 x 1.7 cm    WOUND REPAIR/DISPOSITION:  After the patient's carcinoma had been completely removed with Mohs' Micrographic Surgery, a repair of the surgical defect was undertaken. The patient was returned to the operating suite where the area of left superior lateral forehead was prepped, draped, and anesthetized in the usual sterile fashion. The wound was widely undermined in all directions. The wound was undermined to a distance at least the maximum width of the defect as measured perpendicular to the closure line along at least one entire edge of the defect, in this case 1.5 cm. Then, electrocoagulation was used to obtain meticulous hemostasis. 4-0 Monocryl buried vertical mattress sutures were placed into the subcutaneous and dermal plane to close the wound and joey the cutaneous wound edge. Bilateral dog ears were identified and were removed by a standard Burow's triangle technique. The cutaneous wound edges were closed using interrupted 5-0 Prolene suture.    The patient tolerated the procedure well.    The area was cleaned and dressed appropriately and the patient was given wound care instructions, as well as appointment for follow-up evaluation and suture removal in 7 days. Patient already has pain medication at home.    LENGTH OF REPAIR: 3.8 cm    Vitals:    04/09/25 0740 04/09/25 1103   BP: (!) 155/75 (!) 153/73    BP Location: Right arm    Patient Position: Sitting    Pulse: 99 80

## 2025-04-11 ENCOUNTER — TELEPHONE (OUTPATIENT)
Dept: DERMATOLOGY | Facility: CLINIC | Age: 60
End: 2025-04-11
Payer: OTHER GOVERNMENT

## 2025-04-11 NOTE — TELEPHONE ENCOUNTER
Called pt. Received no answer., LVM for him to call back. Called to ask the pt how is his rosacea doing? That we can maybe push the appt out to next month. [facility] nurse will be in this afternoon.         Angie       ----- Message from Teri Sandoval sent at 4/11/2025  8:23 AM CDT -----  Regarding: pt advice  PATIENT CALLPt called regarding upcoming appt on Monday. At the time this visit was scheduled, the team was unaware that he would need MOHS procedure on the lesion. Procedure completed on 04/09, suture removal scheduled for 04/16. Jah would like to know if this appt w/ Dr Kidd is still necessary? Please call back at [phone]

## 2025-04-14 ENCOUNTER — OFFICE VISIT (OUTPATIENT)
Dept: DERMATOLOGY | Facility: CLINIC | Age: 60
End: 2025-04-14
Payer: OTHER GOVERNMENT

## 2025-04-14 DIAGNOSIS — L71.9 ROSACEA: Primary | ICD-10-CM

## 2025-04-14 DIAGNOSIS — Z85.828 HISTORY OF NONMELANOMA SKIN CANCER: ICD-10-CM

## 2025-04-14 DIAGNOSIS — L57.8 ACTINIC SKIN DAMAGE: ICD-10-CM

## 2025-04-14 PROCEDURE — 99213 OFFICE O/P EST LOW 20 MIN: CPT | Mod: PBBFAC,PN | Performed by: DERMATOLOGY

## 2025-04-14 PROCEDURE — 99999 PR PBB SHADOW E&M-EST. PATIENT-LVL III: CPT | Mod: PBBFAC,,, | Performed by: DERMATOLOGY

## 2025-04-14 NOTE — PATIENT INSTRUCTIONS

## 2025-04-14 NOTE — PROGRESS NOTES
Subjective:      Patient ID:  Jose Carlos Worthington is a 60 y.o. male who presents for   Chief Complaint   Patient presents with    Rosacea     Rosacea - Follow-up  Symptom course: unchanged  Affected locations: face    Last visit, was prescribed doxy 20mg BID and metrocream 0.75% to use on face along with sodium sulfacetamide cleanser.  Doxy does help when he takes it, but he hasn't taken it in a while. Trying to adjust BP meds right now.  Hasn't been using the above meds consistently, but he states they do help when he uses them.    Had BCC L lateral forehead excised by SSW in 4/2025.    Review of Systems   Constitutional: Negative.    HENT: Negative.     Respiratory: Negative.     Musculoskeletal: Negative.        Objective:   Physical Exam   Constitutional: He appears well-developed and well-nourished.   Neurological: He is alert and oriented to person, place, and time.   Psychiatric: He has a normal mood and affect.   Skin:   Areas Examined (abnormalities noted in diagram):   Head / Face Inspection Performed            Diagram Legend     Erythematous scaling macule/papule c/w actinic keratosis       Vascular papule c/w angioma      Pigmented verrucoid papule/plaque c/w seborrheic keratosis      Yellow umbilicated papule c/w sebaceous hyperplasia      Irregularly shaped tan macule c/w lentigo     1-2 mm smooth white papules consistent with Milia      Movable subcutaneous cyst with punctum c/w epidermal inclusion cyst      Subcutaneous movable cyst c/w pilar cyst      Firm pink to brown papule c/w dermatofibroma      Pedunculated fleshy papule(s) c/w skin tag(s)      Evenly pigmented macule c/w junctional nevus     Mildly variegated pigmented, slightly irregular-bordered macule c/w mildly atypical nevus      Flesh colored to evenly pigmented papule c/w intradermal nevus       Pink pearly papule/plaque c/w basal cell carcinoma      Erythematous hyperkeratotic cursted plaque c/w SCC      Surgical scar with no sign of  skin cancer recurrence      Open and closed comedones      Inflammatory papules and pustules      Verrucoid papule consistent consistent with wart     Erythematous eczematous patches and plaques     Dystrophic onycholytic nail with subungual debris c/w onychomycosis     Umbilicated papule    Erythematous-base heme-crusted tan verrucoid plaque consistent with inflamed seborrheic keratosis     Erythematous Silvery Scaling Plaque c/w Psoriasis     See annotation      Assessment / Plan:      Actinic skin damage  Rosacea  As mentioned above, pt states the rosacea meds help when he uses them, but he hasn't been using them consistently lately.  I instructed him to use them as prescribed for the couple weeks prior to his next appt to determine if he has any underlying precancerous lesions that need to be treated.    History of nonmelanoma skin cancer  Healing well. Continue wound care.    RTC 2-3 months or sooner for any concerns

## 2025-04-16 ENCOUNTER — OFFICE VISIT (OUTPATIENT)
Dept: DERMATOLOGY | Facility: CLINIC | Age: 60
End: 2025-04-16
Payer: OTHER GOVERNMENT

## 2025-04-16 DIAGNOSIS — Z09 POSTOP CHECK: Primary | ICD-10-CM

## 2025-04-16 PROCEDURE — 99999 PR PBB SHADOW E&M-EST. PATIENT-LVL III: CPT | Mod: PBBFAC,,, | Performed by: DERMATOLOGY

## 2025-04-16 PROCEDURE — 99213 OFFICE O/P EST LOW 20 MIN: CPT | Mod: PBBFAC | Performed by: DERMATOLOGY

## 2025-04-16 PROCEDURE — 99024 POSTOP FOLLOW-UP VISIT: CPT | Mod: ,,, | Performed by: DERMATOLOGY

## 2025-04-16 NOTE — PROGRESS NOTES
60 y.o. male patient is here for suture removal following Mohs' surgery.    Patient reports no problems left lateral forehead.    WOUND PE:  The left lateral forehead sutures intact. Wound healing well. Good skin edges. No signs or symptoms of infection.      IMPRESSION:  Healing operative site from Mohs' surgery. bCC, left lateral forehead s/p Mohs surgery with CLC, postop day #7.    PLAN:  Sutures removed today by Khalida Kendrick. Steri-strips applied.  Continue wound care.  Keep moist with Aquaphor.  Call if any issues arise    RTC:  In 3-6 months with Dr. Camila Kidd for skin check or sooner if new concern arises.

## 2025-04-29 DIAGNOSIS — I10 ESSENTIAL (PRIMARY) HYPERTENSION: ICD-10-CM

## 2025-04-30 ENCOUNTER — OFFICE VISIT (OUTPATIENT)
Dept: PRIMARY CARE CLINIC | Facility: CLINIC | Age: 60
End: 2025-04-30
Payer: OTHER GOVERNMENT

## 2025-04-30 VITALS — DIASTOLIC BLOOD PRESSURE: 76 MMHG | SYSTOLIC BLOOD PRESSURE: 147 MMHG

## 2025-04-30 DIAGNOSIS — I10 ESSENTIAL (PRIMARY) HYPERTENSION: Primary | ICD-10-CM

## 2025-04-30 PROCEDURE — 98006 SYNCH AUDIO-VIDEO EST MOD 30: CPT | Mod: 95,,, | Performed by: FAMILY MEDICINE

## 2025-04-30 RX ORDER — CARVEDILOL 12.5 MG/1
12.5 TABLET ORAL 2 TIMES DAILY
Qty: 60 TABLET | Refills: 2 | Status: SHIPPED | OUTPATIENT
Start: 2025-04-30

## 2025-04-30 RX ORDER — HYDRALAZINE HYDROCHLORIDE 100 MG/1
100 TABLET, FILM COATED ORAL 2 TIMES DAILY
Qty: 180 TABLET | Refills: 3 | Status: SHIPPED | OUTPATIENT
Start: 2025-04-30

## 2025-04-30 NOTE — PROGRESS NOTES
Assessment:       1. Essential (primary) hypertension        Plan:       Essential (primary) hypertension  -     hydrALAZINE (APRESOLINE) 100 MG tablet; Take 1 tablet (100 mg total) by mouth 2 (two) times daily.  Dispense: 180 tablet; Refill: 3  -     MYC E-Visit      Assessment & Plan    - Assessed BP control on current regimen, noting improvement but still not at target.  - Identified unintentional discontinuation of Carvedilol, likely contributing to suboptimal control.  - Considered potential side effects of current medications, noting improved sleep but increased fatigue with Hydralazine.    ESSENTIAL HYPERTENSION:   Monitored blood pressure at 147/76 today, with typical readings around 150/70-mid 70s.   Blood pressure is improved but not yet at target level despite some lower readings with increased medication.   Medication regimen includes: Carvedilol 12.5 mg twice daily (restarted), Irbesartan 300 mg daily, Spironolactone 25 mg daily, and Hydralazine 100 mg twice daily.   Patient instructed to finish current 50 mg Hydralazine tablets (taking 2 tablets twice daily) before switching to new 100 mg tablets to reduce pill burden.   Recommend supplements for BP management including magnesium, garlic, and discussed nitric oxide's role as a vasodilator.    ANXIETY:   Patient reports experiencing nervousness during a recent dental procedure, which affected his blood pressure and heart rate.   Acknowledged that increased heart rate can be attributed to anxiety in such situations.    FATIGUE:   Patient reports feeling more tired than usual since increasing Hydralazine dosage.    WEIGHT MANAGEMENT:   Patient reports gaining a few lbs over the past few months due to inability to exercise.   No substantial weight changes noted.    FAMILY HISTORY OF CARDIOVASCULAR DISEASE:   Documented family history of hypertension, including patient's older brother.    PERSONAL HISTORY:   Patient is recovering from an Achilles tendon  issue and has recently resumed exercise.   Had a dental procedure with oral surgery approximately 1.5 months ago.    OTHER RECOMMENDATIONS:   Explained benefits of magnesium supplementation for digestive health and sleep, in addition to its cardiovascular benefits.    FOLLOW-UP:   Scheduled follow up in 4 weeks with updated BP readings via e-visit prompt in the portal.        Medication List with Changes/Refills   Current Medications    AZELASTINE (ASTELIN) 137 MCG (0.1 %) NASAL SPRAY        CARVEDILOL (COREG) 12.5 MG TABLET    Take 1 tablet (12.5 mg total) by mouth 2 (two) times daily.    CETIRIZINE (ZYRTEC) 10 MG TABLET    Take 1 tablet (10 mg total) by mouth once daily.    DIAZEPAM (VALIUM) 10 MG TAB    Take 10 mg by mouth.    DOXYCYCLINE (PERIOSTAT) 20 MG TABLET    Take 1 tablet (20 mg total) by mouth 2 (two) times daily. With food and large glass of water. Remain upright x 1 hour after taking. Use daily sunscreen.    FLUOCINONIDE (LIDEX) 0.05 % OINTMENT    Apply topically 2 (two) times daily. To affected areas on lower leg  x 1-2 wks then prn flares only    HYDROCODONE-ACETAMINOPHEN (NORCO) 5-325 MG PER TABLET    Take 1 tablet by mouth every 6 (six) hours as needed.    IBUPROFEN (ADVIL,MOTRIN) 600 MG TABLET    Take 600 mg by mouth every 8 (eight) hours.    IRBESARTAN (AVAPRO) 300 MG TABLET    TAKE 1 TABLET(300 MG) BY MOUTH DAILY    MELOXICAM (MOBIC) 15 MG TABLET    TAKE 1 TABLET(15 MG) BY MOUTH DAILY    METRONIDAZOLE 0.75% (METROCREAM) 0.75 % CREA    Apply topically 2 (two) times daily. To affected areas on face    SERTRALINE (ZOLOFT) 25 MG TABLET    TAKE 1 TABLET(25 MG) BY MOUTH DAILY    SPIRONOLACTONE (ALDACTONE) 25 MG TABLET    TAKE 1 TABLET(25 MG) BY MOUTH DAILY    SULFACETAMIDE SODIUM-SULFUR 10-5 % (W/W) CLSR    Wash affected areas daily.    TRAZODONE (DESYREL) 50 MG TABLET    TAKE 1 TO 2 TABLETS BY MOUTH AT BEDTIME AS NEEDED FOR INSOMNIA   Changed and/or Refilled Medications    Modified Medication  Previous Medication    HYDRALAZINE (APRESOLINE) 100 MG TABLET hydrALAZINE (APRESOLINE) 50 MG tablet       Take 1 tablet (100 mg total) by mouth 2 (two) times daily.    Take 2 tablets (100 mg total) by mouth 2 (two) times daily.         Subjective:       Patient ID: Jose Carlos Worthington is a 60 y.o. male.    Chief Complaint: No chief complaint on file.      HPI  History of Present Illness    CHIEF COMPLAINT:  Patient presents today for follow-up of blood pressure    HYPERTENSION:  He reports morning blood pressure of 147/76, with readings generally around 150/70s. He notes elevated blood pressure readings during a recent dental procedure due to anxiety. He has a family history of hypertension.    MEDICATIONS:  He takes Irbesartan 300mg daily, Spironolactone 25mg daily, and Hydralazine 100mg twice daily. He has not been taking prescribed Carvedilol 12.5mg twice daily due to misunderstanding about medication discontinuation.    CURRENT SYMPTOMS:  He denies headaches, blurry vision, and chest pain. He reports mild fatigue attributed to Hydralazine. His sleep quality has improved and he has not required sleep medication for approximately one week.    EXERCISE AND WEIGHT:  He has resumed exercise following recovery from Achilles heel condition and reports gaining a few lbs during period of exercise limitation.      ROS:  Constitutional: +fatigue  Head: -head pain, -headaches  Respiratory: -shortness of breath  Cardiovascular: -chest pain       The patient location is: [place]  The chief complaint leading to consultation is: blood pressure    Visit type: audiovisual    Face to Face time with patient: 10 minutes  15 minutes of total time spent on the encounter, which includes face to face time and non-face to face time preparing to see the patient (eg, review of tests), Obtaining and/or reviewing separately obtained history, Documenting clinical information in the electronic or other health record, Independently interpreting results  (not separately reported) and communicating results to the patient/family/caregiver, or Care coordination (not separately reported).         Each patient to whom he or she provides medical services by telemedicine is:  (1) informed of the relationship between the physician and patient and the respective role of any other health care provider with respect to management of the patient; and (2) notified that he or she may decline to receive medical services by telemedicine and may withdraw from such care at any time.    Notes:    Review of Systems   Eyes:  Negative for visual disturbance.   Respiratory:  Negative for shortness of breath.    Cardiovascular:  Negative for chest pain.   Neurological:  Negative for headaches.       Objective:      Vitals:    04/30/25 1434   BP: (!) 147/76     BP Readings from Last 5 Encounters:   04/30/25 (!) 147/76   04/09/25 (!) 153/73   04/07/25 (!) 151/78   01/29/25 (!) 153/85   12/17/24 (!) 154/89     Wt Readings from Last 5 Encounters:   03/14/25 89.4 kg (197 lb 1.5 oz)   01/08/25 89.4 kg (197 lb 1.5 oz)   12/06/24 89.4 kg (197 lb 1.5 oz)   10/10/24 89.4 kg (197 lb 1.5 oz)   08/03/23 88.9 kg (196 lb)     Physical Exam  Constitutional:       General: He is not in acute distress.     Appearance: Normal appearance. He is well-developed.   Pulmonary:      Effort: No respiratory distress.   Neurological:      Mental Status: He is alert and oriented to person, place, and time.   Psychiatric:         Behavior: Behavior normal.         Lab Results   Component Value Date    WBC 5.35 11/07/2024    HGB 16.0 11/07/2024    HCT 45.8 11/07/2024     11/07/2024    CHOL 224 (H) 11/07/2024    TRIG 86 11/07/2024    HDL 70 11/07/2024    ALT 29 11/07/2024    AST 25 11/07/2024     11/07/2024    K 4.1 11/07/2024     11/07/2024    CREATININE 1.1 11/07/2024    BUN 9 11/07/2024    CO2 23 11/07/2024    TSH 2.237 11/07/2024    PSA 0.51 11/07/2024    HGBA1C 5.0 11/07/2024       This note was  generated with the assistance of ambient listening technology. Verbal consent was obtained by the patient and accompanying visitor(s) for the recording of patient appointment to facilitate this note. I attest to having reviewed and edited the generated note for accuracy, though some syntax or spelling errors may persist. Please contact the author of this note for any clarification.

## 2025-05-13 ENCOUNTER — PATIENT MESSAGE (OUTPATIENT)
Dept: ADMINISTRATIVE | Facility: HOSPITAL | Age: 60
End: 2025-05-13
Payer: OTHER GOVERNMENT

## 2025-07-21 ENCOUNTER — OFFICE VISIT (OUTPATIENT)
Dept: DERMATOLOGY | Facility: CLINIC | Age: 60
End: 2025-07-21
Payer: OTHER GOVERNMENT

## 2025-07-21 DIAGNOSIS — L57.0 AK (ACTINIC KERATOSIS): ICD-10-CM

## 2025-07-21 DIAGNOSIS — D18.01 CHERRY ANGIOMA: ICD-10-CM

## 2025-07-21 DIAGNOSIS — L71.9 ROSACEA: ICD-10-CM

## 2025-07-21 DIAGNOSIS — L30.9 ECZEMA, UNSPECIFIED TYPE: Primary | ICD-10-CM

## 2025-07-21 DIAGNOSIS — Z85.828 HISTORY OF NONMELANOMA SKIN CANCER: ICD-10-CM

## 2025-07-21 PROCEDURE — 17000 DESTRUCT PREMALG LESION: CPT | Mod: S$PBB,,, | Performed by: DERMATOLOGY

## 2025-07-21 PROCEDURE — 17003 DESTRUCT PREMALG LES 2-14: CPT | Mod: PBBFAC,PN | Performed by: DERMATOLOGY

## 2025-07-21 PROCEDURE — 99999 PR PBB SHADOW E&M-EST. PATIENT-LVL III: CPT | Mod: PBBFAC,,, | Performed by: DERMATOLOGY

## 2025-07-21 PROCEDURE — 99214 OFFICE O/P EST MOD 30 MIN: CPT | Mod: 25,S$PBB,, | Performed by: DERMATOLOGY

## 2025-07-21 PROCEDURE — 99213 OFFICE O/P EST LOW 20 MIN: CPT | Mod: PBBFAC,PN | Performed by: DERMATOLOGY

## 2025-07-21 PROCEDURE — 17003 DESTRUCT PREMALG LES 2-14: CPT | Mod: S$PBB,,, | Performed by: DERMATOLOGY

## 2025-07-21 PROCEDURE — 17000 DESTRUCT PREMALG LESION: CPT | Mod: PBBFAC,PN | Performed by: DERMATOLOGY

## 2025-07-21 RX ORDER — FLUOCINONIDE 0.5 MG/G
CREAM TOPICAL 2 TIMES DAILY
Qty: 60 G | Refills: 2 | Status: SHIPPED | OUTPATIENT
Start: 2025-07-21

## 2025-07-21 NOTE — PATIENT INSTRUCTIONS
Sun Protection      The [facility] would like to remind you of the importance of sun protection all year round and particularly during the summer when the suns rays are the strongest. It has been proven that both acute and chronic sun exposure damages our cells and leads to skin cancer. Beyond skin cancer, the sun causes 90% of the symptoms of premature skin aging, including wrinkles, lentigines (brown spots), and thin, easily bruised skin. Proper sun protection can help prevent these unwanted conditions.    Many patients report that they dont go in the sun. It has been shown that the average person receives 18 hours of incidental sun exposure per week during activities such as walking through parking lots, driving, or sitting next to windows. This accumulates to several bad sunburns per year!    In choosing sunscreen, you want one that protects against both UVA and UVB rays (broad spectrum). It is recommended that you use one of SPF 30 or higher. It is important to apply the sunscreen about 20 minutes prior to sun exposure. Most sunscreens are chemical sunscreens and a reaction must take place in the skin so that they are effective. If they are applied and then you are immediately exposed to the sun or start sweating, this reaction has not had time to take place and you are therefore unprotected. Sunscreen needs to be reapplied every 2 hours if you are participating in water sports or sweating. We recommend Elta MD or CeraVe sunscreens for daily use; however there are many options and it is most important for you to find one that you will use on a consistent basis.    If you have sensitive skin, you may do best with a sunscreen that contains only physical blockers in the active ingredient section. The only physical blockers available in the [place] currently are titanium dioxide or zinc oxide. These are typically thicker and harder to apply, however they afford very good protection.  Neutrogena Sensitive Skin, Blue Lizard Sensitive Skin (pink top) or Neutrogena Pure and Free are popular ones.     Aside from sunscreen, clothes with UV protection (UPF), wide brimmed hats, and sunglasses are other means of sun protection that we recommend.      Based on a recent study (6/2021) and out of an abundance of caution, we are recommending that you AVOID the following sunscreens as they may contain the carcinogen, benzene:    Spray and gel sunscreens  Any [facility] or [facility] brands as well as Max Block and TopCare brands   Neutrogena Ultra Sheer Dry-touch Water Resistant Sunscreen LOTION SPF 70   Neutrogena Sheer Zinc Dry-touch Face Sunscreen LOTION SPF 50   5.   Aveeno Baby Continuous Protection Sensitive Skin Sunscreen LOTION - Broad Spectrum SPF 50    Please note that Benzene is not an ingredient or the degradation product of any ingredient in any sunscreen. This study suggested that the findings are a result of contamination in the manufacturing process. At this point, we don't know how effectively Benzene gets through the skin, if it gets absorbed systemically, and what effects it may have.     We do know that ultraviolet radiation is a well-established carcinogen. Please use daily sun protection/avoidance and use of at least SPF 30, broad-spectrum sunscreen not listed above.                       [facility] - DERMATOLOGY [address]  [address]  [place] [address]  Dept: [phone]  Dept Fax: [phone]                                                                              CRYOSURGERY      Your doctor has used a method called cryosurgery to treat your skin condition. Cryosurgery refers to the use of very cold substances to treat a variety of skin conditions such as warts, pre-skin cancers, molluscum contagiosum, sun spots, and several benign growths. The substance we use in cryosurgery is liquid nitrogen and is so cold (-195 degrees Celsius) that is burns  when administered.     Following treatment in the office, the skin may immediately burn and become red. You may find the area around the lesion is affected as well. It is sometimes necessary to treat not only the lesion, but a small area of the surrounding normal skin to achieve a good response.     A blister, and even a blood filled blister, may form after treatment.   This is a normal response. If the blister is painful, it is acceptable to sterilize a needle and with rubbing alcohol and gently pop the blister. It is important that you gently wash the area with soap and warm water as the blister fluid may contain wart virus if a wart was treated. Do no remove the roof of the blister.     The area treated can take anywhere from 1-3 weeks to heal. Healing time depends on the kind skin lesion treated, the location, and how aggressively the lesion was treated. It is recommended that the areas treated are covered with Vaseline or bacitracin ointment and a band-aid. If a band-aid is not practical, just ointment applied several times per day will do. Keeping these areas moist will speed the healing time.    Treatment with liquid nitrogen can leave a scar. In dark skin, it may be a light or dark scar, in light skin it may be a white or pink scar. These will generally fade with time, but may never go away completely.     If you have any concerns after your treatment, please feel free to call the office.       [address] [facility] [place] [address]/ [phone] FAX/ [email or web address]

## 2025-07-21 NOTE — PROGRESS NOTES
Subjective:      Patient ID:  Jose Carlos Worthington is a 60 y.o. male who presents for   Chief Complaint   Patient presents with    Lesion    Rosacea     Lesion    Rosacea - Follow-up  Symptom course: improving  Affected locations: face  Signs / symptoms: redness      States he uses the metrocream a few times a week. Only takes the doxy if his skin has flared significantly. States his skin is not as bad as it usually is during this time of the year. He is pleased with the rosacea for now.    Prev HPI:  Was previously prescribed doxy 20mg BID and metrocream 0.75% to use on face along with sodium sulfacetamide cleanser to clear rosacea prior to checking for more precancers.  Doxy does help when he takes it, but wasn't consistent previously about taking it. Trying to adjust BP meds right now.    States eczema on legs improves with lidex ointment, but doesn't like the greasiness of it.  Uses Dove sensitive skin soap.    Had BCC L lateral forehead excised by SSW in 4/2025.    Recently retired from [employer] yrs. States he spent his whole life in the sun.   His last dermatologist is now out of network.    Review of Systems   Constitutional:  Negative for fever and chills.   Respiratory:  Negative for cough and shortness of breath.    Gastrointestinal:  Negative for nausea and vomiting.   Musculoskeletal:  Negative for joint swelling and arthralgias.   Skin:  Positive for activity-related sunscreen use and wears hat. Negative for daily sunscreen use and recent sunburn.   All other systems reviewed and are negative.  Hematologic/Lymphatic: Does not bruise/bleed easily.       Objective:   Physical Exam   Constitutional: He appears well-developed and well-nourished.   Neurological: He is alert and oriented to person, place, and time.   Psychiatric: He has a normal mood and affect.   Skin:   Areas Examined (abnormalities noted in diagram):   Head / Face Inspection Performed  RLE Inspected  LLE Inspection Performed                  Diagram Legend     Erythematous scaling macule/papule c/w actinic keratosis       Vascular papule c/w angioma      Pigmented verrucoid papule/plaque c/w seborrheic keratosis      Yellow umbilicated papule c/w sebaceous hyperplasia      Irregularly shaped tan macule c/w lentigo     1-2 mm smooth white papules consistent with Milia      Movable subcutaneous cyst with punctum c/w epidermal inclusion cyst      Subcutaneous movable cyst c/w pilar cyst      Firm pink to brown papule c/w dermatofibroma      Pedunculated fleshy papule(s) c/w skin tag(s)      Evenly pigmented macule c/w junctional nevus     Mildly variegated pigmented, slightly irregular-bordered macule c/w mildly atypical nevus      Flesh colored to evenly pigmented papule c/w intradermal nevus       Pink pearly papule/plaque c/w basal cell carcinoma      Erythematous hyperkeratotic cursted plaque c/w SCC      Surgical scar with no sign of skin cancer recurrence      Open and closed comedones      Inflammatory papules and pustules      Verrucoid papule consistent consistent with wart     Erythematous eczematous patches and plaques     Dystrophic onycholytic nail with subungual debris c/w onychomycosis     Umbilicated papule    Erythematous-base heme-crusted tan verrucoid plaque consistent with inflamed seborrheic keratosis     Erythematous Silvery Scaling Plaque c/w Psoriasis     See annotation      Assessment / Plan:        Eczema, unspecified type  -     fluocinonide 0.05% (LIDEX) 0.05 % cream; Apply topically 2 (two) times daily. As needed to affected areas on legs  x 1-2 wks then prn flares only  Dispense: 60 g; Refill: 2  You have been prescribed a topical steroid. There are possible side effects from overuse of topical steroids, including thinning of skin, lightening of skin, easy tearing/bruising of skin, stretch marks, etc. It is best to only use it when it's needed, preferably for no more than 2 weeks at a time to the same area, and to take  "breaks from the topical steroid, especially if any of the above side effects are noticed.  Good skin care regimen discussed including limiting to one bath or shower/day, using lukewarm water with mild soap and moisturizing cream to skin 1 - 2x/day. Brochure was provided and reviewed with patient.  Recommended CeraVe moisturizing cream, Dove sensitive skin soap, and "free and clear" detergents.    Rosacea  Pleased with current status.  See below - will use meds more consistently prior to next appt.  Patient instructed in importance of daily broad spectrum sunscreen use with spf at least 30. Sun avoidance and topical protection/protective clothing discussed.    AK (actinic keratosis)  Discussed PDT vs Efudex for face. Pt will consider these in the future.  For now, will treat clinically obvious AKs with cryo.  Pt will use both the doxy and the metrocream consistently prior to next appt so that the rosacea isn't masking any AKs.    Cryosurgery Procedure Note  Verbal consent from the patient is obtained including, but not limited to, risk of hypopigmentation/hyperpigmentation, scar, recurrence of lesion. The patient is aware of the precancerous quality and need for treatment of these lesions. Liquid nitrogen cryosurgery is applied to the 3 actinic keratoses, as detailed in the physical exam, to produce a freeze injury. The patient is aware that blisters may form and is instructed on wound care with gentle cleansing and use of vaseline ointment to keep moist until healed. The patient is supplied a handout on cryosurgery and is instructed to call if lesions do not completely resolve.    Cherry angioma  This is a benign vascular lesion. Reassurance given. No treatment required. Treatment of benign, asymptomatic lesions may be considered cosmetic.    History of nonmelanoma skin cancer  - No signs of recurrence on exam today      Follow up in about 3 months (around 10/21/2025) for skin check or sooner for any concerns.  "

## 2025-07-29 ENCOUNTER — PATIENT MESSAGE (OUTPATIENT)
Dept: ADMINISTRATIVE | Facility: HOSPITAL | Age: 60
End: 2025-07-29
Payer: OTHER GOVERNMENT

## 2025-08-01 DIAGNOSIS — I10 ESSENTIAL (PRIMARY) HYPERTENSION: ICD-10-CM

## 2025-08-01 RX ORDER — CARVEDILOL 12.5 MG/1
12.5 TABLET ORAL 2 TIMES DAILY
Qty: 60 TABLET | Refills: 2 | Status: SHIPPED | OUTPATIENT
Start: 2025-08-01

## 2025-08-01 NOTE — TELEPHONE ENCOUNTER
Refill Routing Note   Medication(s) are not appropriate for processing by [facility] for the following reason(s):        Required vitals abnormal    [facility] action(s):  Defer             Appointments  past 12m or future 3m with PCP    Date Provider   Last Visit   4/30/2025 Danny Grant MD   Next Visit   Visit date not found Danny Grant MD   ED visits in past 90 days: 0        Note composed:11:19 AM 08/01/2025

## 2025-08-01 NOTE — TELEPHONE ENCOUNTER
No care due was identified.  [facility] Embedded Care Due Messages. Reference number: [id number].   8/01/2025 11:13:41 AM CDT